# Patient Record
Sex: FEMALE | Race: WHITE | NOT HISPANIC OR LATINO
[De-identification: names, ages, dates, MRNs, and addresses within clinical notes are randomized per-mention and may not be internally consistent; named-entity substitution may affect disease eponyms.]

---

## 2019-04-04 PROBLEM — Z00.00 ENCOUNTER FOR PREVENTIVE HEALTH EXAMINATION: Status: ACTIVE | Noted: 2019-04-04

## 2019-04-29 ENCOUNTER — APPOINTMENT (OUTPATIENT)
Dept: CARDIOLOGY | Facility: CLINIC | Age: 69
End: 2019-04-29
Payer: MEDICARE

## 2019-04-29 ENCOUNTER — RECORD ABSTRACTING (OUTPATIENT)
Age: 69
End: 2019-04-29

## 2019-04-29 ENCOUNTER — NON-APPOINTMENT (OUTPATIENT)
Age: 69
End: 2019-04-29

## 2019-04-29 VITALS
OXYGEN SATURATION: 99 % | HEIGHT: 64 IN | SYSTOLIC BLOOD PRESSURE: 146 MMHG | WEIGHT: 174 LBS | BODY MASS INDEX: 29.71 KG/M2 | DIASTOLIC BLOOD PRESSURE: 78 MMHG | HEART RATE: 87 BPM

## 2019-04-29 DIAGNOSIS — Z87.891 PERSONAL HISTORY OF NICOTINE DEPENDENCE: ICD-10-CM

## 2019-04-29 DIAGNOSIS — Z17.0 ESTROGEN RECEPTOR POSITIVE STATUS [ER+]: ICD-10-CM

## 2019-04-29 DIAGNOSIS — Z86.59 PERSONAL HISTORY OF OTHER MENTAL AND BEHAVIORAL DISORDERS: ICD-10-CM

## 2019-04-29 DIAGNOSIS — Z86.39 PERSONAL HISTORY OF OTHER ENDOCRINE, NUTRITIONAL AND METABOLIC DISEASE: ICD-10-CM

## 2019-04-29 DIAGNOSIS — Z78.9 OTHER SPECIFIED HEALTH STATUS: ICD-10-CM

## 2019-04-29 PROCEDURE — 93000 ELECTROCARDIOGRAM COMPLETE: CPT

## 2019-04-29 PROCEDURE — 99204 OFFICE O/P NEW MOD 45 MIN: CPT

## 2019-04-29 RX ORDER — SENNOSIDES 8.6 MG
TABLET ORAL DAILY
Refills: 0 | Status: ACTIVE | COMMUNITY

## 2019-04-29 NOTE — HISTORY OF PRESENT ILLNESS
[FreeTextEntry1] : Her medical history includes\par #1 essential hypertension.\par #2 stage IA papillary thyroid carcinoma, complete thyroidectomy\par #3 history of lying was my name maureen to his\par #4 history of right breast cancer, status post right mastectomy, ER positive breast cancer stage IC in 1996.\par #5 severe gastroesophageal reflux disease.\par #6 osteoporosis.\par #7 history of relapsing mononucleosis

## 2019-04-29 NOTE — REVIEW OF SYSTEMS
[Feeling Fatigued] : feeling fatigued [see HPI] : see HPI [Joint Pain] : joint pain [Joint Stiffness] : joint stiffness [Negative] : Heme/Lymph [Fever] : no fever [Headache] : no headache [Recent Weight Gain (___ Lbs)] : no recent weight gain [Chills] : no chills [Recent Weight Loss (___ Lbs)] : no recent weight loss

## 2019-04-29 NOTE — DISCUSSION/SUMMARY
[FreeTextEntry1] : 69-year-old white female with history of thyroid and breast cancer, recently diagnosed to have elevated blood pressure, which is better controlled with antihypertensive therapy, atypical chest pain in presence of incomplete right bundle branch block on EKG, exertional dyspnea in presence of normal TSH, hemoglobin and renal function, intermittent palpitations without any significant high-grade symptoms with normal TSH and stable. Other labs.\par She has evidence of atherosclerotic vascular disease in the form of bilateral carotid bruits, right more than left, systolic, ejection murmur to suggest aortic valve sclerosis\par Recommendation at present for further evaluation includes\par Echocardiogram .\par Carotid Doppler study.\par 24-hour Holter monitor and if needed event recorder to evaluate her intermittent\par Stress myocardial perfusion scan on medication.\par Increase irbesartan to 300 mg at nighttime. Counseling done regarding blood pressure goal of less than 130/80\par CMP, lipid panel, BNP level.\par \par Low-salt, alcohol, and caffeine intake\par Increasing activity and exercise.\par Consider a sleep apnea evaluation in presence of generalized daytime fatigue and tiredness and according to her possible snoring at nighttime in presence of elevated blood pressure.\par \par Counseling regarding low saturated fat, salt and carbohydrate intake was reviewed. Active lifestyle and regular. Exercise along with weight management is advised.\par All the above were at length reviewed. Answered all the questions. Thank you very much for this kind referral. Please do not hesitate to give me a call for any question.\par Part of this transcription was done with voice recognition software and phonetically similar errors are common. I apologize for that. Please donot hesitate to call for any questions due to above.\par \par Followup after above tests

## 2019-04-29 NOTE — ASSESSMENT
[FreeTextEntry1] : Reviewed on April 29, 2019\par EKG normal sinus rhythm, incomplete right bundle branch block.\par Labs TSH 1.34, April 9, 2019\par Labs March 20, 2019 p.o. hemoglobin 13.2.\par Creatinine 1.0, BUN 18, potassium 4.2, sodium 142.

## 2019-04-29 NOTE — PHYSICAL EXAM
[General Appearance - Well Developed] : well developed [Normal Appearance] : normal appearance [Well Groomed] : well groomed [General Appearance - Well Nourished] : well nourished [No Deformities] : no deformities [General Appearance - In No Acute Distress] : no acute distress [Normal Conjunctiva] : the conjunctiva exhibited no abnormalities [Eyelids - No Xanthelasma] : the eyelids demonstrated no xanthelasmas [Normal Oral Mucosa] : normal oral mucosa [No Oral Pallor] : no oral pallor [No Oral Cyanosis] : no oral cyanosis [Normal Jugular Venous A Waves Present] : normal jugular venous A waves present [Normal Jugular Venous V Waves Present] : normal jugular venous V waves present [No Jugular Venous Gonzalez A Waves] : no jugular venous gonzalez A waves [Respiration, Rhythm And Depth] : normal respiratory rhythm and effort [Exaggerated Use Of Accessory Muscles For Inspiration] : no accessory muscle use [Auscultation Breath Sounds / Voice Sounds] : lungs were clear to auscultation bilaterally [Normal] : normal [No Precordial Heave] : no precordial heave was noted [Normal Rate] : normal [Normal S1] : normal S1 [Normal S2] : normal S2 [II] : a grade 2 [1+] : left 1+ [Right Carotid Bruit] : right carotid bruit heard [Left Carotid Bruit] : left carotid bruit heard [2+] : left 2+ [No Abnormalities] : the abdominal aorta was not enlarged and no bruit was heard [No Pitting Edema] : no pitting edema present [Abdomen Soft] : soft [Abdomen Tenderness] : non-tender [Abdomen Mass (___ Cm)] : no abdominal mass palpated [Abnormal Walk] : normal gait [Gait - Sufficient For Exercise Testing] : the gait was sufficient for exercise testing [Nail Clubbing] : no clubbing of the fingernails [Cyanosis, Localized] : no localized cyanosis [Petechial Hemorrhages (___cm)] : no petechial hemorrhages [Skin Color & Pigmentation] : normal skin color and pigmentation [] : no rash [No Venous Stasis] : no venous stasis [Skin Lesions] : no skin lesions [No Skin Ulcers] : no skin ulcer [No Xanthoma] : no  xanthoma was observed [Oriented To Time, Place, And Person] : oriented to person, place, and time [Affect] : the affect was normal [Mood] : the mood was normal [S3] : no S3 [S4] : no S4 [Right Femoral Bruit] : no bruit heard over the right femoral artery [Left Femoral Bruit] : no bruit heard over the left femoral artery [FreeTextEntry1] : anxiuos

## 2019-04-29 NOTE — REASON FOR VISIT
[Consultation] : a consultation regarding [Chest Pain] : chest pain [Dyspnea] : dyspnea [Hypertension] : hypertension [FreeTextEntry1] : 69-year-old white female is referred to me for consultation and presence of significantly increased. Blood pressure during iron infusion at hematologist office.\par She also has a right upper chest pain/pressure, like sensation. Nonexertional. No radiation. No associated arm pain jaw pain, diaphoresis, or palpitation. Usually lasted for minutes at a time. Resolves on its own.\par She also has exertional dyspnea when walking short distances or climbing stairs. This has not improved since iron infusions.\par Her symptoms have not gotten better by taking antihypertensive, which was started after her recent discussion.\par She also has intermittent palpitations. It does get worse with caffeinated drinks like he in the morning. It is not associated with dizziness and syncopal episode. There is no social or chest pain or shortness of breath.\par She has no prior history of diabetes mellitus, hyperlipidemia, peripheral arterial disease, rheumatic fever, ,, cerebrovascular accident.\par She does not sleep well. Her but does not have any diagnosis of sleep apnea.\par She has no PND, orthopnea, or pedal edema.\par She has no claudication pain.\par

## 2019-05-06 ENCOUNTER — APPOINTMENT (OUTPATIENT)
Dept: CARDIOLOGY | Facility: CLINIC | Age: 69
End: 2019-05-06
Payer: MEDICARE

## 2019-05-08 PROCEDURE — 93224 XTRNL ECG REC UP TO 48 HRS: CPT

## 2019-06-18 ENCOUNTER — APPOINTMENT (OUTPATIENT)
Dept: CARDIOLOGY | Facility: CLINIC | Age: 69
End: 2019-06-18
Payer: MEDICARE

## 2019-06-18 PROCEDURE — 93306 TTE W/DOPPLER COMPLETE: CPT

## 2019-06-18 PROCEDURE — 78451 HT MUSCLE IMAGE SPECT SING: CPT

## 2019-06-18 PROCEDURE — 93015 CV STRESS TEST SUPVJ I&R: CPT

## 2019-06-18 PROCEDURE — 93880 EXTRACRANIAL BILAT STUDY: CPT

## 2019-07-26 ENCOUNTER — APPOINTMENT (OUTPATIENT)
Dept: CARDIOLOGY | Facility: CLINIC | Age: 69
End: 2019-07-26
Payer: MEDICARE

## 2019-07-26 VITALS
OXYGEN SATURATION: 95 % | HEART RATE: 80 BPM | SYSTOLIC BLOOD PRESSURE: 146 MMHG | WEIGHT: 170 LBS | DIASTOLIC BLOOD PRESSURE: 62 MMHG | BODY MASS INDEX: 29.18 KG/M2

## 2019-07-26 DIAGNOSIS — R94.39 ABNORMAL RESULT OF OTHER CARDIOVASCULAR FUNCTION STUDY: ICD-10-CM

## 2019-07-26 PROCEDURE — 99215 OFFICE O/P EST HI 40 MIN: CPT

## 2019-07-26 NOTE — REASON FOR VISIT
[Follow-Up - Clinic] : a clinic follow-up of [Chest Pain] : chest pain [Dyspnea] : dyspnea [FreeTextEntry1] : 69-year-old white female comes in for followup consultation today. Review her multiple cardiovascular test and labs.\par She has continued significant exertional dyspnea since last seen.\par As you know she was seen last on April 29, 2019 with complaints as noted below\par She also has a right upper chest pain/pressure, like sensation. Nonexertional. No radiation. No associated arm pain jaw pain, diaphoresis, or palpitation. Usually lasted for minutes at a time. Resolves on its own.\par She also has exertional dyspnea when walking short distances or climbing stairs. This has not improved since iron infusions.\par Her symptoms have not gotten better by taking antihypertensive, which was started after her recent discussion.\par She also has intermittent palpitations. It does get worse with caffeinated drinks like he in the morning. It is not associated with dizziness and syncopal episode. There is no social or chest pain or shortness of breath.\par She has no prior history of diabetes mellitus, hyperlipidemia, peripheral arterial disease, rheumatic fever, ,, cerebrovascular accident.\par She does not sleep well. Her but does not have any diagnosis of sleep apnea.\par She has no PND, orthopnea, or pedal edema.\par She has no claudication pain.\par  [Hypertension] : hypertension

## 2019-07-26 NOTE — HISTORY OF PRESENT ILLNESS
[FreeTextEntry1] : Her medical history includes\par #1 essential hypertension.\par #2 stage IA papillary thyroid carcinoma, complete thyroidectomy\par #3 ?\par #4 history of right breast cancer, status post right mastectomy, ER positive breast cancer stage IC in 1996.\par #5 severe gastroesophageal reflux disease.\par #6 osteoporosis.\par #7 history of relapsing mononucleosis

## 2019-07-26 NOTE — REVIEW OF SYSTEMS
[Fever] : no fever [Headache] : no headache [Recent Weight Gain (___ Lbs)] : no recent weight gain [Chills] : no chills [Recent Weight Loss (___ Lbs)] : no recent weight loss [Feeling Fatigued] : feeling fatigued [see HPI] : see HPI [Joint Stiffness] : joint stiffness [Joint Pain] : joint pain [Negative] : Heme/Lymph

## 2019-07-26 NOTE — DISCUSSION/SUMMARY
[FreeTextEntry1] : 69-year-old white female with history of thyroid and breast cancer, recently diagnosed to have elevated blood pressure, which is better controlled with antihypertensive therapy, atypical chest pain in presence of incomplete right bundle branch block on EKG, exertional dyspnea in presence of normal TSH, hemoglobin and renal function, intermittent palpitations without any significant high-grade symptoms with normal TSH and stable. Other labs.\par Certified evaluations shows preserved ejection fraction with mild mitral regurgitation, mild carotid atherosclerosis, elevated LDL cholesterol, but excellent HDL cholesterol, normal BMP with stable CMP, low level of exercise with significant shortness of breath limiting functional status and suboptimal test to rule out coronary artery disease and technical limitation and difficulty in performing nuclear marker perfusion scan.\par In presence of continued exertional dyspnea at low level of exercise with coronary artery disease factors. We had discussed options of\par Coronary angiography/right heart catheterization.\par Vs CTA of coronary disease, with calcification score.\par At present she has opted for a CTA of coronaries. Understands risk of contrast and radiation.\par She had normal renal function and no allergies to contrast.\par If about test does not show any significant abnormality, consider pulmonary evaluation.\par If there is evidence of coronary artery disease. Consider statin therapy to the present regimen.\par \par Continued antihypertensive therapy and adjust further if blood pressure remains elevated, and greater than 130/80 after her CTA.\par \par Counseling regarding low saturated fat, salt and carbohydrate intake was reviewed. Active lifestyle and regular. Exercise along with weight management is advised.\par All the above were at length reviewed. Answered all the questions. Thank you very much for this kind referral. Please do not hesitate to give me a call for any question.\par Part of this transcription was done with voice recognition software and phonetically similar errors are common. I apologize for that. Please do not hesitate to call for any questions due to above.\par \par \par

## 2019-07-26 NOTE — PHYSICAL EXAM
[General Appearance - Well Developed] : well developed [Normal Appearance] : normal appearance [Well Groomed] : well groomed [General Appearance - Well Nourished] : well nourished [No Deformities] : no deformities [General Appearance - In No Acute Distress] : no acute distress [Normal Conjunctiva] : the conjunctiva exhibited no abnormalities [Eyelids - No Xanthelasma] : the eyelids demonstrated no xanthelasmas [Normal Oral Mucosa] : normal oral mucosa [No Oral Pallor] : no oral pallor [No Oral Cyanosis] : no oral cyanosis [Normal Jugular Venous V Waves Present] : normal jugular venous V waves present [Respiration, Rhythm And Depth] : normal respiratory rhythm and effort [Exaggerated Use Of Accessory Muscles For Inspiration] : no accessory muscle use [Auscultation Breath Sounds / Voice Sounds] : lungs were clear to auscultation bilaterally [Abnormal Walk] : normal gait [Gait - Sufficient For Exercise Testing] : the gait was sufficient for exercise testing [Nail Clubbing] : no clubbing of the fingernails [Cyanosis, Localized] : no localized cyanosis [Skin Color & Pigmentation] : normal skin color and pigmentation [] : no rash [Oriented To Time, Place, And Person] : oriented to person, place, and time [Affect] : the affect was normal [FreeTextEntry1] : anxiuos [Mood] : the mood was normal [Normal Rate] : normal [No Precordial Heave] : no precordial heave was noted [Normal] : normal [Normal S2] : normal S2 [Normal S1] : normal S1 [S4] : no S4 [S3] : no S3 [II] : a grade 2 [1+] : Carotid: right 1+ [Right Carotid Bruit] : right carotid bruit heard [Left Carotid Bruit] : left carotid bruit heard [Left Femoral Bruit] : no bruit heard over the left femoral artery [Right Femoral Bruit] : no bruit heard over the right femoral artery [2+] : left 2+ [No Abnormalities] : the abdominal aorta was not enlarged and no bruit was heard [No Pitting Edema] : no pitting edema present

## 2019-07-26 NOTE — ASSESSMENT
[FreeTextEntry1] : Reviewed on April 29, 2019\par EKG normal sinus rhythm, incomplete right bundle branch block.\par Labs TSH 1.34, April 9, 2019\par Labs March 20, 2019 p.o. hemoglobin 13.2.\par Creatinine 1.0, BUN 18, potassium 4.2, sodium 142.\par \par Reviewed on July 26, 2019\par Labs from May 30, 2019 had shown normal. BNP, HDL 80 .\par Echocardiogram June 18, 2019 and ejection fraction 60% mild mitral regurgitation\par Carotid Doppler study June 18, 2019 mild carotid atherosclerosis nonobstructive.\par Exercise treadmill stress test low level of exercise. Suboptimal.\par Nuclear marker perfusion scan could not be completed because of technical difficulty\par

## 2019-07-30 ENCOUNTER — RX RENEWAL (OUTPATIENT)
Age: 69
End: 2019-07-30

## 2019-08-23 ENCOUNTER — APPOINTMENT (OUTPATIENT)
Dept: CARDIOLOGY | Facility: CLINIC | Age: 69
End: 2019-08-23
Payer: MEDICARE

## 2019-08-23 VITALS
BODY MASS INDEX: 28.68 KG/M2 | HEIGHT: 64 IN | WEIGHT: 168 LBS | DIASTOLIC BLOOD PRESSURE: 62 MMHG | OXYGEN SATURATION: 98 % | HEART RATE: 71 BPM | SYSTOLIC BLOOD PRESSURE: 118 MMHG

## 2019-08-23 DIAGNOSIS — A69.20 LYME DISEASE, UNSPECIFIED: ICD-10-CM

## 2019-08-23 PROCEDURE — 99214 OFFICE O/P EST MOD 30 MIN: CPT

## 2019-08-23 NOTE — REVIEW OF SYSTEMS
[Fever] : no fever [Headache] : no headache [Recent Weight Gain (___ Lbs)] : no recent weight gain [Chills] : no chills [Feeling Fatigued] : feeling fatigued [see HPI] : see HPI [Recent Weight Loss (___ Lbs)] : no recent weight loss [Joint Stiffness] : joint stiffness [Joint Pain] : joint pain [Negative] : Heme/Lymph

## 2019-08-23 NOTE — DISCUSSION/SUMMARY
[FreeTextEntry1] : 69-year-old white female with history of thyroid and breast cancer, recently diagnosed to have elevated blood pressure, which is better controlled with antihypertensive therapy, atypical chest pain in presence of incomplete right bundle branch block on EKG, exertional dyspnea in presence of normal TSH, hemoglobin and renal function, intermittent palpitations without any significant high-grade symptoms with normal TSH and stable. Other labs.\par cardiac evaluations shows preserved ejection fraction with mild mitral regurgitation, mild carotid atherosclerosis, elevated LDL cholesterol, but excellent HDL cholesterol, normal BMP with stable CMP, low level of exercise with significant shortness of breath limiting functional status and suboptimal test to rule out coronary artery disease and technical limitation and difficulty in performing nuclear myocardial perfusion scan.Now coronary CTA shows nonobstructive calcified/noncalcified coronary atherosclerotic vascular disease.\par \par I have recommended to her.\par Pulmonary evaluation.\par Aspirin 81 mg once daily.\par Atorvastatin 40 mg once daily. Risks, benefits, and side effects discussed. If tolerated well. She will have a CMP, and lipid panel checked in about 6-8 weeks.\par If continued symptoms and no significant pulmonary disease. She would benefit from invasive coronary angiography/right heart catheterization.\par Continue hypertension, management. goal <130/80\par \par Counseling regarding low saturated fat, salt and carbohydrate intake was reviewed. Active lifestyle and regular. Exercise along with weight management is advised.\par All the above were at length reviewed. Answered all the questions. Thank you very much for this kind referral. Please do not hesitate to give me a call for any question.\par Part of this transcription was done with voice recognition software and phonetically similar errors are common. I apologize for that. Please do not hesitate to call for any questions due to above.\par \par \par

## 2019-08-23 NOTE — PHYSICAL EXAM
[General Appearance - Well Developed] : well developed [Normal Appearance] : normal appearance [Well Groomed] : well groomed [General Appearance - Well Nourished] : well nourished [No Deformities] : no deformities [General Appearance - In No Acute Distress] : no acute distress [Normal Conjunctiva] : the conjunctiva exhibited no abnormalities [Normal Oral Mucosa] : normal oral mucosa [Eyelids - No Xanthelasma] : the eyelids demonstrated no xanthelasmas [No Oral Pallor] : no oral pallor [No Oral Cyanosis] : no oral cyanosis [Normal Jugular Venous V Waves Present] : normal jugular venous V waves present [] : no respiratory distress [Respiration, Rhythm And Depth] : normal respiratory rhythm and effort [Exaggerated Use Of Accessory Muscles For Inspiration] : no accessory muscle use [Auscultation Breath Sounds / Voice Sounds] : lungs were clear to auscultation bilaterally [Heart Rate And Rhythm] : heart rate and rhythm were normal [Heart Sounds] : normal S1 and S2 [Arterial Pulses Normal] : the arterial pulses were normal [Edema] : no peripheral edema present [Abnormal Walk] : normal gait [Gait - Sufficient For Exercise Testing] : the gait was sufficient for exercise testing [Nail Clubbing] : no clubbing of the fingernails [Cyanosis, Localized] : no localized cyanosis [Skin Color & Pigmentation] : normal skin color and pigmentation [Oriented To Time, Place, And Person] : oriented to person, place, and time [Affect] : the affect was normal [Mood] : the mood was normal [FreeTextEntry1] : anxiuos

## 2019-08-23 NOTE — REASON FOR VISIT
[Follow-Up - Clinic] : a clinic follow-up of [Chest Pain] : chest pain [Dyspnea] : dyspnea [Hypertension] : hypertension [FreeTextEntry1] : 69-year-old white female comes in for followup consultation today To review coronary CTA.\par She has continued significant exertional dyspnea since last seen.\par As you know she was seen in the past with complaints as noted below\par She also has a right upper chest pain/pressure, like sensation. Nonexertional. No radiation. No associated arm pain jaw pain, diaphoresis, or palpitation. Usually lasted for minutes at a time. Resolves on its own.\par She also has exertional dyspnea when walking short distances or climbing stairs. This has not improved since iron infusions.\par Her symptoms have not gotten better by taking antihypertensive, which was started after her recent discussion.\par She also has intermittent palpitations. It does get worse with caffeinated drinks like he in the morning. It is not associated with dizziness and syncopal episode. There is no social or chest pain or shortness of breath.\par She has no prior history of diabetes mellitus, hyperlipidemia, peripheral arterial disease, rheumatic fever, ,, cerebrovascular accident.\par She does not sleep well. Her but does not have any diagnosis of sleep apnea.\par She has no PND, orthopnea, or pedal edema.\par She has no claudication pain.\par

## 2019-08-23 NOTE — ASSESSMENT
[FreeTextEntry1] : Reviewed on April 29, 2019\par EKG normal sinus rhythm, incomplete right bundle branch block.\par Labs TSH 1.34, April 9, 2019\par Labs March 20, 2019 p.o. hemoglobin 13.2.\par Creatinine 1.0, BUN 18, potassium 4.2, sodium 142.\par \par Reviewed on July 26, 2019\par Labs from May 30, 2019 had shown normal. BNP, HDL 80 .\par Echocardiogram June 18, 2019 and ejection fraction 60% mild mitral regurgitation\par Carotid Doppler study June 18, 2019 mild carotid atherosclerosis nonobstructive.\par Exercise treadmill stress test low level of exercise. Suboptimal.\par Nuclear marker perfusion scan could not be completed because of technical difficulty\par \par Reviewed on August 23, 2019\par Coronary CTA, August 15, 2019. No active pulmonary disease.\par Coronary calcium score 106.\par Atherosclerosis changes with no hemodynamically significant stenosis. Mixture of calcified and noncalcified plaque in proximal and midportion of the LAD, left circumflex/OM one, right coronary artery.

## 2019-10-29 ENCOUNTER — APPOINTMENT (OUTPATIENT)
Dept: MAMMOGRAPHY | Facility: CLINIC | Age: 69
End: 2019-10-29
Payer: MEDICARE

## 2019-10-29 ENCOUNTER — APPOINTMENT (OUTPATIENT)
Dept: ULTRASOUND IMAGING | Facility: CLINIC | Age: 69
End: 2019-10-29

## 2019-10-29 PROCEDURE — 77067 SCR MAMMO BI INCL CAD: CPT | Mod: 52,LT

## 2019-10-29 PROCEDURE — 77063 BREAST TOMOSYNTHESIS BI: CPT | Mod: 52,LT

## 2019-10-29 PROCEDURE — 76641 ULTRASOUND BREAST COMPLETE: CPT | Mod: LT

## 2020-02-20 ENCOUNTER — APPOINTMENT (OUTPATIENT)
Dept: CARDIOLOGY | Facility: CLINIC | Age: 70
End: 2020-02-20

## 2020-02-27 ENCOUNTER — APPOINTMENT (OUTPATIENT)
Dept: MRI IMAGING | Facility: CLINIC | Age: 70
End: 2020-02-27
Payer: MEDICARE

## 2020-02-27 PROCEDURE — A9585A: CUSTOM

## 2020-02-27 PROCEDURE — A9585: CPT | Mod: JW

## 2020-02-27 PROCEDURE — 74183 MRI ABD W/O CNTR FLWD CNTR: CPT

## 2020-05-01 ENCOUNTER — APPOINTMENT (OUTPATIENT)
Dept: CARDIOLOGY | Facility: CLINIC | Age: 70
End: 2020-05-01

## 2020-07-31 ENCOUNTER — APPOINTMENT (OUTPATIENT)
Dept: CARDIOLOGY | Facility: CLINIC | Age: 70
End: 2020-07-31
Payer: MEDICARE

## 2020-07-31 ENCOUNTER — NON-APPOINTMENT (OUTPATIENT)
Age: 70
End: 2020-07-31

## 2020-07-31 VITALS
DIASTOLIC BLOOD PRESSURE: 62 MMHG | HEART RATE: 67 BPM | BODY MASS INDEX: 28.17 KG/M2 | TEMPERATURE: 98.4 F | HEIGHT: 64 IN | SYSTOLIC BLOOD PRESSURE: 142 MMHG | WEIGHT: 165 LBS | OXYGEN SATURATION: 99 %

## 2020-07-31 PROCEDURE — 93000 ELECTROCARDIOGRAM COMPLETE: CPT

## 2020-07-31 PROCEDURE — 99214 OFFICE O/P EST MOD 30 MIN: CPT

## 2020-07-31 RX ORDER — DOXYCYCLINE HYCLATE 50 MG/1
CAPSULE ORAL
Refills: 0 | Status: DISCONTINUED | COMMUNITY
End: 2020-07-31

## 2020-07-31 RX ORDER — ASPIRIN ENTERIC COATED TABLETS 81 MG 81 MG/1
81 TABLET, DELAYED RELEASE ORAL DAILY
Qty: 90 | Refills: 3 | Status: DISCONTINUED | COMMUNITY
Start: 2019-08-23 | End: 2020-07-31

## 2020-07-31 NOTE — PHYSICAL EXAM
[Well Groomed] : well groomed [Normal Appearance] : normal appearance [General Appearance - Well Developed] : well developed [No Deformities] : no deformities [General Appearance - In No Acute Distress] : no acute distress [General Appearance - Well Nourished] : well nourished [Normal Oral Mucosa] : normal oral mucosa [Normal Conjunctiva] : the conjunctiva exhibited no abnormalities [Eyelids - No Xanthelasma] : the eyelids demonstrated no xanthelasmas [No Oral Pallor] : no oral pallor [No Oral Cyanosis] : no oral cyanosis [Normal Jugular Venous V Waves Present] : normal jugular venous V waves present [] : no respiratory distress [Exaggerated Use Of Accessory Muscles For Inspiration] : no accessory muscle use [Respiration, Rhythm And Depth] : normal respiratory rhythm and effort [Heart Rate And Rhythm] : heart rate and rhythm were normal [Auscultation Breath Sounds / Voice Sounds] : lungs were clear to auscultation bilaterally [Heart Sounds] : normal S1 and S2 [Edema] : no peripheral edema present [Gait - Sufficient For Exercise Testing] : the gait was sufficient for exercise testing [Abnormal Walk] : normal gait [Cyanosis, Localized] : no localized cyanosis [Nail Clubbing] : no clubbing of the fingernails [Affect] : the affect was normal [Skin Color & Pigmentation] : normal skin color and pigmentation [Oriented To Time, Place, And Person] : oriented to person, place, and time [Mood] : the mood was normal [Abdomen Soft] : soft [FreeTextEntry1] : No bruit

## 2020-07-31 NOTE — REVIEW OF SYSTEMS
[Feeling Fatigued] : feeling fatigued [Joint Pain] : joint pain [Joint Stiffness] : joint stiffness [see HPI] : see HPI [Negative] : Heme/Lymph [Fever] : no fever [Headache] : no headache [Chills] : no chills [Recent Weight Gain (___ Lbs)] : no recent weight gain [Recent Weight Loss (___ Lbs)] : no recent weight loss [Shortness Of Breath] : shortness of breath [Dyspnea on exertion] : dyspnea during exertion [Chest  Pressure] : no chest pressure [Chest Pain] : no chest pain [Lower Ext Edema] : no extremity edema [Leg Claudication] : intermittent leg claudication [Palpitations] : palpitations

## 2020-07-31 NOTE — REASON FOR VISIT
[Follow-Up - Clinic] : a clinic follow-up of [Chest Pain] : chest pain [Dyspnea] : dyspnea [Hypertension] : hypertension [FreeTextEntry1] : 70-year-old white female comes in for followup consultation today with complaint of right calf tightness every time she walks quickly for 100 to 200 yards.  Once she rest symptoms improves.  None at rest.  No associated swelling or change in the color.  She has noticed it over the last few weeks.  Has not gotten worse.  No recent trauma.  No back pain.\par She has continued significant exertional dyspnea since last seen.  She has seen pulmonologist.  Evaluation was not completed because of COVID-19.  Now planned to have further evaluation as scheduled.\par Her symptoms have not gotten better by taking antihypertensive, which was started after her recent discussion.\par She also has intermittent palpitations. It does get worse with caffeinated drinks like he in the morning. It is not associated with dizziness and syncopal episode. There is no social or chest pain or shortness of breath.  Overall stable.\par She has no prior history of diabetes mellitus, hyperlipidemia, peripheral arterial disease, rheumatic fever, ,, cerebrovascular accident.\par She does not sleep well. Her but does not have any diagnosis of sleep apnea.\par She has no PND, orthopnea, or pedal edema.\par Her statin was decreased to every other day because of elevated alkaline phosphatase.\par No recent hospital admission\par

## 2020-07-31 NOTE — ASSESSMENT
[FreeTextEntry1] : Reviewed on April 29, 2019\par EKG normal sinus rhythm, incomplete right bundle branch block.\par Labs TSH 1.34, April 9, 2019\par Labs March 20, 2019 p.o. hemoglobin 13.2.\par Creatinine 1.0, BUN 18, potassium 4.2, sodium 142.\par \par Reviewed on July 26, 2019\par Labs from May 30, 2019 had shown normal. BNP, HDL 80 .\par Echocardiogram June 18, 2019 and ejection fraction 60% mild mitral regurgitation\par Carotid Doppler study June 18, 2019 mild carotid atherosclerosis nonobstructive.\par Exercise treadmill stress test low level of exercise. Suboptimal.\par Nuclear marker perfusion scan could not be completed because of technical difficulty\par \par Reviewed on August 23, 2019\par Coronary CTA, August 15, 2019. No active pulmonary disease.\par Coronary calcium score 106.\par Atherosclerosis changes with no hemodynamically significant stenosis. Mixture of calcified and noncalcified plaque in proximal and midportion of the LAD, left circumflex/OM one, right coronary artery.\par \par Reviewed on July 31, 2020.\par EKG July 31, 2020 reviewed.\par Labs from February 27, 2020 stable CBC.  Triglycerides 71 HDL 87 LDL 93 total cholesterol 194

## 2020-07-31 NOTE — DISCUSSION/SUMMARY
[FreeTextEntry1] : 70-year-old white female with history of thyroid and breast cancer, recently diagnosed to have elevated blood pressure, which is better controlled with antihypertensive therapy, atypical chest pain in presence of incomplete right bundle branch block on EKG, exertional dyspnea in presence of normal TSH, hemoglobin and renal function, intermittent palpitations without any significant high-grade symptoms with normal TSH and stable. Other labs.\par cardiac evaluations shows preserved ejection fraction with mild mitral regurgitation, mild carotid atherosclerosis, elevated LDL cholesterol, but excellent HDL cholesterol, normal BMP with stable CMP, low level of exercise with significant shortness of breath limiting functional status and suboptimal test to rule out coronary artery disease and technical limitation and difficulty in performing nuclear myocardial perfusion scan.Now coronary CTA shows nonobstructive calcified/noncalcified coronary atherosclerotic vascular disease.\par Now comes in with right lower extremity claudication pain.  Without resting signs of ischemia.\par LDL cholesterol improved with lower dose of statin and presence of abnormal liver function tests at a higher dose of statins.\par Pulmonary evaluation is not completed for her dyspnea on exertion.\par \par I have recommended to her.\par GORDO/PVR study.  Consider vascular evaluation if abnormal.\par Aspirin 81 mg once daily.  It did give her hemorrhoidal bleeding.  Recommended to try to take it every other day.\par Atorvastatin to be continued every other day at 40 mg.  More aggressive lifestyle and risk factor modifications.\par If continued symptoms and no significant pulmonary disease. She would benefit from invasive coronary angiography/right heart catheterization.\par Continue hypertension, management. goal <130/80, continue irbesartan 300 mg.\par Regular increasing walking is recommended.\par \par Counseling regarding low saturated fat, salt and carbohydrate intake was reviewed. Active lifestyle and regular. Exercise along with weight management is advised.\par All the above were at length reviewed. Answered all the questions. Thank you very much for this kind referral. Please do not hesitate to give me a call for any question.\par Part of this transcription was done with voice recognition software and phonetically similar errors are common. I apologize for that. Please do not hesitate to call for any questions due to above.\par \par \par

## 2020-09-16 ENCOUNTER — APPOINTMENT (OUTPATIENT)
Dept: CARDIOLOGY | Facility: CLINIC | Age: 70
End: 2020-09-16
Payer: MEDICARE

## 2020-09-16 PROCEDURE — 93923 UPR/LXTR ART STDY 3+ LVLS: CPT

## 2020-10-30 ENCOUNTER — APPOINTMENT (OUTPATIENT)
Dept: CARDIOLOGY | Facility: CLINIC | Age: 70
End: 2020-10-30
Payer: MEDICARE

## 2020-10-30 VITALS
SYSTOLIC BLOOD PRESSURE: 148 MMHG | HEIGHT: 66 IN | WEIGHT: 168 LBS | BODY MASS INDEX: 27 KG/M2 | OXYGEN SATURATION: 100 % | HEART RATE: 79 BPM | DIASTOLIC BLOOD PRESSURE: 62 MMHG

## 2020-10-30 DIAGNOSIS — J43.9 EMPHYSEMA, UNSPECIFIED: ICD-10-CM

## 2020-10-30 PROCEDURE — 99214 OFFICE O/P EST MOD 30 MIN: CPT

## 2020-10-30 NOTE — REASON FOR VISIT
[Follow-Up - Clinic] : a clinic follow-up of [Chest Pain] : chest pain [Dyspnea] : dyspnea [Hypertension] : hypertension [FreeTextEntry1] : 70-year-old white female comes in for follow-up consultation to review her GORDO/PVR study, recent pulmonary evaluation, review her blood pressure control which has been significantly elevated during her iron infusions.\par She could not tolerate bronchodilators which were given for mild emphysema by pulmonologist after pulmonary function test.\par She has been noted to have significantly elevated blood pressure during the Venofer injections.  As high as 182/78.\par She has stable exertional dyspnea.\par She also has intermittent palpitations. It does get worse with caffeinated drinks like he in the morning. It is not associated with dizziness and syncopal episode. There is no social or chest pain or shortness of breath.\par She has no prior history of diabetes mellitus, hyperlipidemia, peripheral arterial disease, rheumatic fever, ,, cerebrovascular accident.\par She does not sleep well. Her but does not have any diagnosis of sleep apnea.\par She has no PND, orthopnea, or pedal edema.\par She has no claudication pain.\par

## 2020-10-30 NOTE — ASSESSMENT
[FreeTextEntry1] : Reviewed on April 29, 2019\par EKG normal sinus rhythm, incomplete right bundle branch block.\par Labs TSH 1.34, April 9, 2019\par Labs March 20, 2019 p.o. hemoglobin 13.2.\par Creatinine 1.0, BUN 18, potassium 4.2, sodium 142.\par \par Reviewed on July 26, 2019\par Labs from May 30, 2019 had shown normal. BNP, HDL 80 .\par Echocardiogram June 18, 2019 and ejection fraction 60% mild mitral regurgitation\par Carotid Doppler study June 18, 2019 mild carotid atherosclerosis nonobstructive.\par Exercise treadmill stress test low level of exercise. Suboptimal.\par Nuclear marker perfusion scan could not be completed because of technical difficulty\par \par Reviewed on August 23, 2019\par Coronary CTA, August 15, 2019. No active pulmonary disease.\par Coronary calcium score 106.\par Atherosclerosis changes with no hemodynamically significant stenosis. Mixture of calcified and noncalcified plaque in proximal and midportion of the LAD, left circumflex/OM one, right coronary artery.\par \par Reviewed October 30, 2020.\par GORDO/PVR study.  Left lower extremity noncompressible.  Right could not be evaluated.\par Labs which were done recently showed stable  CMP and lipid panel.

## 2020-10-30 NOTE — DISCUSSION/SUMMARY
[FreeTextEntry1] : 71-year-old female with above medical history active medical problems as noted below\par 1.  Thyroid breast cancer, iron deficiency anemia follow-up with oncology\par 2.  Uncontrolled hypertension.  Compliance with salt alcohol medication has been noted.  Will add chlorthalidone 25 mg to the present regimen.  If tolerated and improved blood pressure control we can combine it with her irbesartan.  If remains uncontrolled combination of ARB, calcium channel blocker like amlodipine and thiazide diuretics would be wise.\par Continue low-salt diet increasing exercise and lower alcohol intake recommended\par 3.  Claudication pain.  Right lower extremity arterial duplex study is unable to perform GORDO has been recommended.\par 4.  Dyslipidemia.  Very well controlled continue with atorvastatin\par 5.  Hypothyroidism being followed with primary care physician\par 6.  Dyspnea.  Mild emphysema.  Unable to tolerate inhalers.\par 7.  Nonobstructive coronary artery disease.  Continue lifestyle and risk factor modifications.\par \par Counseling regarding low saturated fat, salt and carbohydrate intake was reviewed. Active lifestyle and regular. Exercise along with weight management is advised.\par All the above were at length reviewed. Answered all the questions. Thank you very much for this kind referral. Please do not hesitate to give me a call for any question.\par Part of this transcription was done with voice recognition software and phonetically similar errors are common. I apologize for that. Please do not hesitate to call for any questions due to above.\par \par \par

## 2020-10-30 NOTE — REVIEW OF SYSTEMS
[Feeling Fatigued] : feeling fatigued [see HPI] : see HPI [Joint Pain] : joint pain [Joint Stiffness] : joint stiffness [Negative] : Heme/Lymph [Shortness Of Breath] : shortness of breath [Dyspnea on exertion] : dyspnea during exertion [Leg Claudication] : intermittent leg claudication [Fever] : no fever [Headache] : no headache [Recent Weight Gain (___ Lbs)] : no recent weight gain [Chills] : no chills [Recent Weight Loss (___ Lbs)] : no recent weight loss [Chest  Pressure] : no chest pressure [Chest Pain] : no chest pain [Lower Ext Edema] : no extremity edema [Palpitations] : no palpitations

## 2020-10-30 NOTE — PHYSICAL EXAM
[General Appearance - Well Developed] : well developed [Normal Appearance] : normal appearance [Well Groomed] : well groomed [General Appearance - Well Nourished] : well nourished [No Deformities] : no deformities [General Appearance - In No Acute Distress] : no acute distress [Normal Conjunctiva] : the conjunctiva exhibited no abnormalities [Eyelids - No Xanthelasma] : the eyelids demonstrated no xanthelasmas [] : no respiratory distress [Respiration, Rhythm And Depth] : normal respiratory rhythm and effort [Exaggerated Use Of Accessory Muscles For Inspiration] : no accessory muscle use [Auscultation Breath Sounds / Voice Sounds] : lungs were clear to auscultation bilaterally [Heart Rate And Rhythm] : heart rate and rhythm were normal [Heart Sounds] : normal S1 and S2 [Arterial Pulses Normal] : the arterial pulses were normal [Edema] : no peripheral edema present [Abnormal Walk] : normal gait [Gait - Sufficient For Exercise Testing] : the gait was sufficient for exercise testing [Nail Clubbing] : no clubbing of the fingernails [Cyanosis, Localized] : no localized cyanosis [Skin Color & Pigmentation] : normal skin color and pigmentation [Oriented To Time, Place, And Person] : oriented to person, place, and time [Affect] : the affect was normal [Mood] : the mood was normal [FreeTextEntry1] : anxiuos

## 2020-11-10 ENCOUNTER — APPOINTMENT (OUTPATIENT)
Dept: MRI IMAGING | Facility: CLINIC | Age: 70
End: 2020-11-10
Payer: MEDICARE

## 2020-11-10 ENCOUNTER — APPOINTMENT (OUTPATIENT)
Dept: ULTRASOUND IMAGING | Facility: CLINIC | Age: 70
End: 2020-11-10

## 2020-11-10 PROCEDURE — 73221 MRI JOINT UPR EXTREM W/O DYE: CPT | Mod: RT,MH

## 2020-11-10 PROCEDURE — 93926 LOWER EXTREMITY STUDY: CPT | Mod: RT

## 2020-11-30 ENCOUNTER — APPOINTMENT (OUTPATIENT)
Dept: CARDIOLOGY | Facility: CLINIC | Age: 70
End: 2020-11-30
Payer: MEDICARE

## 2020-11-30 VITALS — WEIGHT: 165 LBS | BODY MASS INDEX: 26.52 KG/M2 | HEIGHT: 66 IN

## 2020-11-30 PROCEDURE — 99443: CPT | Mod: 95

## 2020-11-30 RX ORDER — LEVOTHYROXINE SODIUM 100 UG/1
100 TABLET ORAL
Refills: 0 | Status: ACTIVE | COMMUNITY

## 2020-11-30 RX ORDER — ERGOCALCIFEROL 1.25 MG/1
1.25 MG CAPSULE, LIQUID FILLED ORAL
Qty: 12 | Refills: 0 | Status: ACTIVE | COMMUNITY
Start: 2020-09-14

## 2020-11-30 RX ORDER — CHOLECALCIFEROL (VITAMIN D3) 1250 MCG
1.25 MG CAPSULE ORAL WEEKLY
Refills: 0 | Status: DISCONTINUED | COMMUNITY
End: 2020-11-30

## 2020-11-30 NOTE — REVIEW OF SYSTEMS
[Feeling Fatigued] : feeling fatigued [see HPI] : see HPI [Shortness Of Breath] : shortness of breath [Dyspnea on exertion] : dyspnea during exertion [Leg Claudication] : intermittent leg claudication [Joint Pain] : joint pain [Joint Stiffness] : joint stiffness [Negative] : Heme/Lymph [Fever] : no fever [Headache] : no headache [Recent Weight Gain (___ Lbs)] : no recent weight gain [Chills] : no chills [Recent Weight Loss (___ Lbs)] : no recent weight loss [Chest  Pressure] : no chest pressure [Chest Pain] : no chest pain [Lower Ext Edema] : no extremity edema [Palpitations] : no palpitations

## 2020-11-30 NOTE — DISCUSSION/SUMMARY
[FreeTextEntry1] : 70-year-old female with above medical history active medical problems as noted below\par 1.  Labs reviewed.  Mild hyponatremia elevated BUN.  Increase fluid intake.  Decrease chlorthalidone to 12.5 mg.\par 2.  Uncontrolled hypertension.  Compliance with salt alcohol medication has been noted.  Follow-up blood pressure in the office.  Decrease chlorthalidone to 12.5 mg considering lower sodium level..  If tolerated and improved blood pressure control we can combine it with her irbesartan.  If remains uncontrolled combination of ARB, calcium channel blocker like amlodipine and thiazide diuretics would be wise.\par Continue low-salt diet increasing exercise and lower alcohol intake recommended\par 3.  Claudication pain.  Significant stenosis on Doppler study involving distal right common femoral and superficial femoral artery.  Follow-up with vascular medicine physician.  Continue aspirin and statin.  Walking.  Hypertension management.  Possible further evaluation may include CT angiography with runoff and/or conventional peripheral angiography.\par 4.  Dyslipidemia.  Very well controlled continue with atorvastatin\par 5.  Hypothyroidism being followed with primary care physician\par 6.  Dyspnea.  Mild emphysema.  Unable to tolerate inhalers.\par 7.  Nonobstructive coronary artery disease.  Continue lifestyle and risk factor modifications.  Continue statin aspirin and risk factor modification\par \par Counseling regarding low saturated fat, salt and carbohydrate intake was reviewed. Active lifestyle and regular. Exercise along with weight management is advised.\par All the above were at length reviewed. Answered all the questions. Thank you very much for this kind referral. Please do not hesitate to give me a call for any question.\par Part of this transcription was done with voice recognition software and phonetically similar errors are common. I apologize for that. Please do not hesitate to call for any questions due to above.\par \par \par

## 2020-11-30 NOTE — REASON FOR VISIT
[Hyperlipidemia] : hyperlipidemia [Hypertension] : hypertension [Peripheral Vascular Disease] : peripheral vascular disease [FreeTextEntry2] : 2 way audio visit to review labs, hypertwnsion, PAD [FreeTextEntry1] : Consent: Patient consented to telephone visit.\par Time: A total of25 minutes was spent in review of pertinent medical records, discussion with the patient, evaluation of patient problem, and coordination of a care plan as part of this telephone visit.\par Physical examination was not performed as a  the risk of COVID-19 cross-contamination to be greater than the benefit to the patient conferred by the physical examination.\par \par 70-year-old had a 2 way audio visit with me.  She is at home.  I am at [facility].\par Reviewed labs, her blood pressure readings and lower extremity ultrasound reports.\par She is continue intermittent cramps in the right leg.\par She has no new complaint of chest pain.  There is no PND orthopnea.\par Limited functional status\par Stable exertional dyspnea\par Intermittent palpitation which has not changed\par No near syncopal or syncopal event\par No hospital admission\par No other changes in medications\par

## 2020-11-30 NOTE — ASSESSMENT
[FreeTextEntry1] : Reviewed on April 29, 2019\par EKG normal sinus rhythm, incomplete right bundle branch block.\par Labs TSH 1.34, April 9, 2019\par Labs March 20, 2019 p.o. hemoglobin 13.2.\par Creatinine 1.0, BUN 18, potassium 4.2, sodium 142.\par \par Reviewed on July 26, 2019\par Labs from May 30, 2019 had shown normal. BNP, HDL 80 .\par Echocardiogram June 18, 2019 and ejection fraction 60% mild mitral regurgitation\par Carotid Doppler study June 18, 2019 mild carotid atherosclerosis nonobstructive.\par Exercise treadmill stress test low level of exercise. Suboptimal.\par Nuclear marker perfusion scan could not be completed because of technical difficulty\par \par Reviewed on August 23, 2019\par Coronary CTA, August 15, 2019. No active pulmonary disease.\par Coronary calcium score 106.\par Atherosclerosis changes with no hemodynamically significant stenosis. Mixture of calcified and noncalcified plaque in proximal and midportion of the LAD, left circumflex/OM one, right coronary artery.\par \par Reviewed October 30, 2020.\par GORDO/PVR study.  Left lower extremity noncompressible.  Right could not be evaluated.\par Labs which were done recently showed stable  CMP and lipid panel.\par \par Reviewed November 30, 2020\par Right lower extremity Doppler ultrasound showed a high-grade distal right common femoral/superficial femoral artery stenosis.\par Labs from November 23, 2020 sodium 135 potassium 3.7 creatinine 1.05

## 2020-12-08 ENCOUNTER — APPOINTMENT (OUTPATIENT)
Dept: RADIOLOGY | Facility: CLINIC | Age: 70
End: 2020-12-08
Payer: MEDICARE

## 2020-12-08 PROCEDURE — 77080 DXA BONE DENSITY AXIAL: CPT

## 2020-12-10 ENCOUNTER — APPOINTMENT (OUTPATIENT)
Dept: MAMMOGRAPHY | Facility: CLINIC | Age: 70
End: 2020-12-10
Payer: MEDICARE

## 2020-12-10 ENCOUNTER — APPOINTMENT (OUTPATIENT)
Dept: ULTRASOUND IMAGING | Facility: CLINIC | Age: 70
End: 2020-12-10

## 2020-12-10 PROCEDURE — 76641 ULTRASOUND BREAST COMPLETE: CPT | Mod: LT

## 2020-12-10 PROCEDURE — G0279: CPT | Mod: LT

## 2020-12-10 PROCEDURE — 77065 DX MAMMO INCL CAD UNI: CPT | Mod: LT

## 2020-12-22 ENCOUNTER — APPOINTMENT (OUTPATIENT)
Dept: CARDIOLOGY | Facility: CLINIC | Age: 70
End: 2020-12-22

## 2021-01-25 ENCOUNTER — RESULT CHARGE (OUTPATIENT)
Age: 71
End: 2021-01-25

## 2021-01-26 ENCOUNTER — APPOINTMENT (OUTPATIENT)
Dept: CARDIOLOGY | Facility: CLINIC | Age: 71
End: 2021-01-26
Payer: MEDICARE

## 2021-01-26 VITALS
WEIGHT: 166 LBS | OXYGEN SATURATION: 100 % | DIASTOLIC BLOOD PRESSURE: 64 MMHG | HEART RATE: 84 BPM | SYSTOLIC BLOOD PRESSURE: 132 MMHG | BODY MASS INDEX: 26.68 KG/M2 | HEIGHT: 66 IN

## 2021-01-26 DIAGNOSIS — R06.02 SHORTNESS OF BREATH: ICD-10-CM

## 2021-01-26 DIAGNOSIS — R07.89 OTHER CHEST PAIN: ICD-10-CM

## 2021-01-26 PROCEDURE — 99215 OFFICE O/P EST HI 40 MIN: CPT

## 2021-01-26 RX ORDER — ASPIRIN 81 MG/1
81 TABLET, COATED ORAL EVERY OTHER DAY
Refills: 0 | Status: DISCONTINUED | COMMUNITY
Start: 2020-07-31 | End: 2021-01-26

## 2021-01-26 NOTE — DISCUSSION/SUMMARY
[FreeTextEntry1] : \par \par 70-year-old F referred for peripheral arterial disease by lower extremity lifestyle limiting claudication at mild to moderate exertion.  Does not use tobacco.  Denies angina orthopnea.  Stable exertional dyspnea, follows with pulmonology. Lower extremity arterial ultrasound significant for high-grade stenosis of the right distal CFA/proximal SFA.  Lab work reviewed.  She is unable to tolerate aspirin at present due to significant hemorrhoidal bleeding, we will continue to reassess.  She can tolerate DAPT post peripheral intervention is needed. No ulcers.  No crescendo or rest pain.  Left lower extremity not affected and GORDO normal on left.\par \par \par 1.  Labs reviewed.  Mild hyponatremia elevated BUN.  Increase fluid intake.  Continue the Decreased chlorthalidone to 12.5 mg.\par 2.  HTN better controlled.  Compliance with salt decrease has been noted.  Follow-up blood pressure in the office.  If tolerated and improved blood pressure control we can combine it with her irbesartan.  If remains uncontrolled combination of ARB, calcium channel blocker like amlodipine and thiazide diuretics would be wise. Continue low-salt diet increasing exercise and lower alcohol intake recommended. She prefers to maintain current medication regimen.\par 3.  Claudication pain. Lifestyle limiting Chani IIb RLE calf claudication. Significant stenosis on Doppler study involving distal right common femoral and superficial femoral artery. Offered her peripheral angiogram + PVI if indicated however she would like to schedule for 6 months as she is moving currently and needs to lift. No emergent indication. Continue statin. She will attempt to tolerate aspirin (hemorrhoidal bleeding). \par 4.  Dyslipidemia.  Very well controlled continue with atorvastatin\par 5.  Hypothyroidism being followed with primary care physician\par 6.  Dyspnea.  Mild emphysema.  Unable to tolerate inhalers.\par 7.  Nonobstructive coronary artery disease.  Continue lifestyle and risk factor modifications.  Continue statin aspirin and risk factor modification\par \par Right carotid bruit: carotid duplex ordered. TTE ordered given dyspnea although stable but last 2 years ago. \par \par Follow-up in 6 months and as needed.  I will call patient with carotid duplex and TTE results.She will call office if she wants to expedite this appointment and if she would like procedure prior to this.\par \par

## 2021-01-26 NOTE — ASSESSMENT
[FreeTextEntry1] : Reviewed on April 29, 2019\par EKG normal sinus rhythm, incomplete right bundle branch block.\par Labs TSH 1.34, April 9, 2019\par Labs March 20, 2019 p.o. hemoglobin 13.2.\par Creatinine 1.0, BUN 18, potassium 4.2, sodium 142.\par \par Reviewed on July 26, 2019\par Labs from May 30, 2019 had shown normal. BNP, HDL 80 .\par Echocardiogram June 18, 2019 and ejection fraction 60% mild mitral regurgitation\par Carotid Doppler study June 18, 2019 mild carotid atherosclerosis nonobstructive.\par Exercise treadmill stress test low level of exercise. Suboptimal.\par Nuclear marker perfusion scan could not be completed because of technical difficulty\par \par Reviewed on August 23, 2019\par Coronary CTA, August 15, 2019. No active pulmonary disease.\par Coronary calcium score 106.\par Atherosclerosis changes with no hemodynamically significant stenosis. Mixture of calcified and noncalcified plaque in proximal and midportion of the LAD, left circumflex/OM one, right coronary artery.\par \par Reviewed October 30, 2020.\par GORDO/PVR study.  Left lower extremity noncompressible.  Right could not be evaluated.\par Labs which were done recently showed stable  CMP and lipid panel.\par \par Reviewed November 30, 2020\par Right lower extremity Doppler ultrasound showed a high-grade distal right common femoral/superficial femoral artery stenosis.\par Labs from November 23, 2020 sodium 135 potassium 3.7 creatinine 1.05 no

## 2021-01-26 NOTE — PHYSICAL EXAM
[General Appearance - Well Developed] : well developed [Normal Appearance] : normal appearance [Well Groomed] : well groomed [General Appearance - Well Nourished] : well nourished [No Deformities] : no deformities [General Appearance - In No Acute Distress] : no acute distress [Normal Conjunctiva] : the conjunctiva exhibited no abnormalities [Eyelids - No Xanthelasma] : the eyelids demonstrated no xanthelasmas [Normal Oral Mucosa] : normal oral mucosa [No Oral Pallor] : no oral pallor [No Oral Cyanosis] : no oral cyanosis [Normal Jugular Venous V Waves Present] : normal jugular venous V waves present [No Jugular Venous Gonzalez A Waves] : no jugular venous gonzalez A waves [Respiration, Rhythm And Depth] : normal respiratory rhythm and effort [Exaggerated Use Of Accessory Muscles For Inspiration] : no accessory muscle use [Auscultation Breath Sounds / Voice Sounds] : lungs were clear to auscultation bilaterally [Heart Sounds] : normal S1 and S2 [Heart Rate And Rhythm] : heart rate and rhythm were normal [Bowel Sounds] : normal bowel sounds [Abdomen Soft] : soft [Abdomen Tenderness] : non-tender [Abnormal Walk] : normal gait [Gait - Sufficient For Exercise Testing] : the gait was sufficient for exercise testing [Nail Clubbing] : no clubbing of the fingernails [Cyanosis, Localized] : no localized cyanosis [Petechial Hemorrhages (___cm)] : no petechial hemorrhages [Skin Color & Pigmentation] : normal skin color and pigmentation [] : no rash [No Venous Stasis] : no venous stasis [Skin Lesions] : no skin lesions [No Skin Ulcers] : no skin ulcer [No Xanthoma] : no  xanthoma was observed [Affect] : the affect was normal [Oriented To Time, Place, And Person] : oriented to person, place, and time [Mood] : the mood was normal [No Anxiety] : not feeling anxious [FreeTextEntry1] : Full range of motion

## 2021-01-26 NOTE — REASON FOR VISIT
[Hyperlipidemia] : hyperlipidemia [Hypertension] : hypertension [Peripheral Vascular Disease] : peripheral vascular disease [Medication Management] : Medication management [FreeTextEntry2] : Peripheral arterial disease, hypertension, hyperlipidemia [FreeTextEntry1] : \par 70-year-old F referred for peripheral arterial disease by lower extremity lifestyle limiting claudication at mild to moderate exertion.  Does not use tobacco.  Denies angina orthopnea.  Stable exertional dyspnea, follows with pulmonology.\par \par Lower extremity arterial ultrasound significant for high-grade stenosis of the right distal CFA/proximal SFA.  Lab work reviewed.  She is unable to tolerate aspirin at present due to significant hemorrhoidal bleeding, we will continue to reassess.  She can tolerate DAPT post peripheral intervention is needed.\par \par No ulcers.  No crescendo or rest pain.  Left lower extremity not affected and GORDO normal on left

## 2021-01-26 NOTE — HISTORY OF PRESENT ILLNESS
[FreeTextEntry1] : Her medical history includes\par #1 essential hypertension.\par #2 stage IA papillary thyroid carcinoma, complete thyroidectomy\par #3 \par #4 history of right breast cancer, status post right mastectomy, ER positive breast cancer stage IC in 1996.\par #5 severe gastroesophageal reflux disease.\par #6 osteoporosis.\par #7 history of relapsing mononucleosis\par Peripheral arterial disease

## 2021-02-24 ENCOUNTER — APPOINTMENT (OUTPATIENT)
Dept: CARDIOLOGY | Facility: CLINIC | Age: 71
End: 2021-02-24
Payer: MEDICARE

## 2021-02-24 PROCEDURE — 93880 EXTRACRANIAL BILAT STUDY: CPT

## 2021-02-24 PROCEDURE — 93306 TTE W/DOPPLER COMPLETE: CPT

## 2021-04-15 ENCOUNTER — NON-APPOINTMENT (OUTPATIENT)
Age: 71
End: 2021-04-15

## 2021-04-19 ENCOUNTER — NON-APPOINTMENT (OUTPATIENT)
Age: 71
End: 2021-04-19

## 2021-08-04 ENCOUNTER — NON-APPOINTMENT (OUTPATIENT)
Age: 71
End: 2021-08-04

## 2021-08-04 ENCOUNTER — APPOINTMENT (OUTPATIENT)
Dept: CARDIOLOGY | Facility: CLINIC | Age: 71
End: 2021-08-04
Payer: MEDICARE

## 2021-08-04 VITALS — SYSTOLIC BLOOD PRESSURE: 150 MMHG | DIASTOLIC BLOOD PRESSURE: 78 MMHG

## 2021-08-04 VITALS — WEIGHT: 160 LBS | HEIGHT: 66 IN | HEART RATE: 77 BPM | BODY MASS INDEX: 25.71 KG/M2 | OXYGEN SATURATION: 100 %

## 2021-08-04 PROCEDURE — 93000 ELECTROCARDIOGRAM COMPLETE: CPT

## 2021-08-04 PROCEDURE — 99215 OFFICE O/P EST HI 40 MIN: CPT

## 2021-08-04 NOTE — REASON FOR VISIT
[Symptom and Test Evaluation] : symptom and test evaluation [Hyperlipidemia] : hyperlipidemia [Hypertension] : hypertension [Peripheral Vascular Disease] : peripheral vascular disease [FreeTextEntry1] : 71-year-old female comes in for follow-up consultation.  Reviewed echocardiogram, carotid Doppler study and evaluation management by cardiovascular interventional physician.\par Continues to have intermittent claudication pain at walking 200 yards distance which gets better after stopping for few minutes there is no resting symptoms.  There is no change in the color of the extremities.\par She did not underwent any procedures as recommended based on vascular ultrasound few months ago.  There is no worsening of her claudication distance.\par Does not do regular exercises as recommended.\par \par She saw pulmonologist.  Recommended inhaler.  According to her she had significant side effects.  And stopped taking it.\par \par She could not tolerate every day atorvastatin and chlorthalidone.  Taking it every other day.\par She is continue intermittent cramps in the right leg.\par She has no new complaint of chest pain.  There is no PND orthopnea.\par Limited functional status\par Intermittent palpitation which has not changed\par No near syncopal or syncopal event\par No hospital admission\par No other changes in medications\par  [FreeTextEntry2] : 2 way audio visit to review labs, hypertwnsion, PAD

## 2021-08-04 NOTE — PHYSICAL EXAM
[Well Developed] : well developed [Well Nourished] : well nourished [No Acute Distress] : no acute distress [Normal Conjunctiva] : normal conjunctiva [Normal Venous Pressure] : normal venous pressure [No Carotid Bruit] : no carotid bruit [Normal S1, S2] : normal S1, S2 [No Rub] : no rub [No Gallop] : no gallop [Clear Lung Fields] : clear lung fields [Good Air Entry] : good air entry [No Respiratory Distress] : no respiratory distress  [Soft] : abdomen soft [Non Tender] : non-tender [No Masses/organomegaly] : no masses/organomegaly [Normal Bowel Sounds] : normal bowel sounds [Normal Gait] : normal gait [No Edema] : no edema [No Cyanosis] : no cyanosis [No Clubbing] : no clubbing [No Varicosities] : no varicosities [No Rash] : no rash [No Skin Lesions] : no skin lesions [Moves all extremities] : moves all extremities [No Focal Deficits] : no focal deficits [Normal Speech] : normal speech [Alert and Oriented] : alert and oriented [Normal memory] : normal memory [Normal Radial B/L] : normal radial B/L [Diminished Pedal Pulses ___] : diminished pedal pulses [unfilled] [de-identified] : Systolic murmur 1/6 at the base.

## 2021-08-04 NOTE — DISCUSSION/SUMMARY
[FreeTextEntry1] : 71-year-old female with above medical history active medical problems as noted below\par 1.  Peripheral arterial disease.  Continued right lower extremity claudication pain.  No ischemic changes.  No rest symptoms.  No change in claudication distance which is at around cm 100 m.\par Does not want to take aspirin as she has intermittent need for iron infusion and hemorrhoidal bleeding.  Recommended to discuss this with hematologist strongly recommend to consider use of aspirin.\par Strongly recommend to take statins every day.  She will think about it.\par Recommended labs which includes lipid panel\par 2.  Uncontrolled hypertension.  Compliance with salt alcohol medication has been noted.  She does not want to take chlorthalidone every day.  At present added amlodipine 5 mg to the present regimen.  Risk benefits alternative and side effects reviewed.  She does not check her blood pressure at home.  Her goal is less than 130/80.\par 3.   Dyslipidemia.  Very well controlled continue with atorvastatin\par 4..  Dyspnea.  Mild emphysema.  Unable to tolerate inhalers.  Follow-up with pulmonologist.\par 5.  Nonobstructive coronary artery disease.  Continue lifestyle and risk factor modifications.  Continue statin and risk factor modification.  Again reviewed indication for aspirin.\par \par Counseling regarding low saturated fat, salt and carbohydrate intake was reviewed. Active lifestyle and regular. Exercise along with weight management is advised.\par All the above were at length reviewed. Answered all the questions. Thank you very much for this kind referral. Please do not hesitate to give me a call for any question.\par Part of this transcription was done with voice recognition software and phonetically similar errors are common. I apologize for that. Please do not hesitate to call for any questions due to above.\par \par \par

## 2021-08-04 NOTE — ASSESSMENT
[FreeTextEntry1] : Reviewed on April 29, 2019\par EKG normal sinus rhythm, incomplete right bundle branch block.\par Labs TSH 1.34, April 9, 2019\par Labs March 20, 2019 p.o. hemoglobin 13.2.\par Creatinine 1.0, BUN 18, potassium 4.2, sodium 142.\par \par Reviewed on July 26, 2019\par Labs from May 30, 2019 had shown normal. BNP, HDL 80 .\par Echocardiogram June 18, 2019 and ejection fraction 60% mild mitral regurgitation\par Carotid Doppler study June 18, 2019 mild carotid atherosclerosis nonobstructive.\par Exercise treadmill stress test low level of exercise. Suboptimal.\par Nuclear marker perfusion scan could not be completed because of technical difficulty\par \par Reviewed on August 23, 2019\par Coronary CTA, August 15, 2019. No active pulmonary disease.\par Coronary calcium score 106.\par Atherosclerosis changes with no hemodynamically significant stenosis. Mixture of calcified and noncalcified plaque in proximal and midportion of the LAD, left circumflex/OM one, right coronary artery.\par \par Reviewed October 30, 2020.\par GORDO/PVR study.  Left lower extremity noncompressible.  Right could not be evaluated.\par Labs which were done recently showed stable  CMP and lipid panel.\par \par Reviewed November 30, 2020\par Right lower extremity Doppler ultrasound showed a high-grade distal right common femoral/superficial femoral artery stenosis.\par Labs from November 23, 2020 sodium 135 potassium 3.7 creatinine 1.05\par \par Reviewed August 4, 2021.\par Echocardiogram/carotid Doppler study from February 2021 were reviewed.\par Right lower extremity ultrasound November 10, 2020 was reviewed.\par

## 2021-08-04 NOTE — REVIEW OF SYSTEMS
[Negative] : Heme/Lymph [Feeling Fatigued] : feeling fatigued [SOB] : shortness of breath [Dyspnea on exertion] : dyspnea during exertion [Leg Claudication] : intermittent leg claudication [Joint Pain] : joint pain [Joint Stiffness] : joint stiffness [Fever] : no fever [Weight Gain (___ Lbs)] : no recent weight gain [Chills] : no chills [Weight Loss (___ Lbs)] : no recent weight loss [Chest Discomfort] : no chest discomfort [Lower Ext Edema] : no extremity edema [Palpitations] : no palpitations [Orthopnea] : no orthopnea [PND] : no PND [Syncope] : no syncope [Cough] : no cough [Wheezing] : no wheezing [Coughing Up Blood] : no hemoptysis [Snoring] : no snoring

## 2021-08-04 NOTE — CARDIOLOGY SUMMARY
[de-identified] : 8/4/2021 normal sinus rhythm incomplete right bundle branch block [de-identified] : February 24, 2021 EF 55.  Mild mitral regurgitation.  Normal left atrium.  Mild tricuspid regurgitation.  PASP 30 mmHg [de-identified] : Cardiac coronary CT angiography.  2019.  Atherosclerotic nonobstructive disease.  Coronary calcium score 106. [de-identified] : GORDO/PVR study.  Right could not be evaluated.  Left thigh noncompressible left PT/DP borderline for mild disease.\par Carotid Doppler study.  February 24, 2021.  Mild nonobstructive disease bilaterally.\par November 10, 2020.  Right lower extremity atherosclerotic plaque with high-grade distal right common femoral/superficial femoral artery stenosis.

## 2021-08-05 ENCOUNTER — APPOINTMENT (OUTPATIENT)
Dept: ULTRASOUND IMAGING | Facility: CLINIC | Age: 71
End: 2021-08-05
Payer: MEDICARE

## 2021-08-05 PROCEDURE — 76536 US EXAM OF HEAD AND NECK: CPT

## 2021-12-13 ENCOUNTER — APPOINTMENT (OUTPATIENT)
Dept: CARDIOLOGY | Facility: CLINIC | Age: 71
End: 2021-12-13
Payer: MEDICARE

## 2021-12-13 VITALS
SYSTOLIC BLOOD PRESSURE: 142 MMHG | HEART RATE: 72 BPM | OXYGEN SATURATION: 100 % | BODY MASS INDEX: 26.03 KG/M2 | WEIGHT: 162 LBS | DIASTOLIC BLOOD PRESSURE: 60 MMHG | HEIGHT: 66 IN

## 2021-12-13 VITALS — DIASTOLIC BLOOD PRESSURE: 56 MMHG | SYSTOLIC BLOOD PRESSURE: 128 MMHG

## 2021-12-13 PROCEDURE — 99214 OFFICE O/P EST MOD 30 MIN: CPT

## 2021-12-13 NOTE — ASSESSMENT
[FreeTextEntry1] : Reviewed on April 29, 2019\par EKG normal sinus rhythm, incomplete right bundle branch block.\par Labs TSH 1.34, April 9, 2019\par Labs March 20, 2019 p.o. hemoglobin 13.2.\par Creatinine 1.0, BUN 18, potassium 4.2, sodium 142.\par \par Reviewed on July 26, 2019\par Labs from May 30, 2019 had shown normal. BNP, HDL 80 .\par Echocardiogram June 18, 2019 and ejection fraction 60% mild mitral regurgitation\par Carotid Doppler study June 18, 2019 mild carotid atherosclerosis nonobstructive.\par Exercise treadmill stress test low level of exercise. Suboptimal.\par Nuclear marker perfusion scan could not be completed because of technical difficulty\par \par Reviewed on August 23, 2019\par Coronary CTA, August 15, 2019. No active pulmonary disease.\par Coronary calcium score 106.\par Atherosclerosis changes with no hemodynamically significant stenosis. Mixture of calcified and noncalcified plaque in proximal and midportion of the LAD, left circumflex/OM one, right coronary artery.\par \par Reviewed October 30, 2020.\par GORDO/PVR study.  Left lower extremity noncompressible.  Right could not be evaluated.\par Labs which were done recently showed stable  CMP and lipid panel.\par \par Reviewed November 30, 2020\par Right lower extremity Doppler ultrasound showed a high-grade distal right common femoral/superficial femoral artery stenosis.\par Labs from November 23, 2020 sodium 135 potassium 3.7 creatinine 1.05\par \par Reviewed August 4, 2021.\par Echocardiogram/carotid Doppler study from February 2021 were reviewed.\par Right lower extremity ultrasound November 10, 2020 was reviewed.\par \par Reviewed on December 13, 2021.\par Most recent labs September 22, 2021.  LDL 88 HDL 91 triglycerides 70 creatinine 1.1 sodium 139 potassium 4.0 LFT normal\par \par

## 2021-12-13 NOTE — REVIEW OF SYSTEMS
[Feeling Fatigued] : feeling fatigued [SOB] : shortness of breath [Dyspnea on exertion] : dyspnea during exertion [Leg Claudication] : intermittent leg claudication [Palpitations] : palpitations [Joint Pain] : joint pain [Joint Stiffness] : joint stiffness [Negative] : Heme/Lymph [Fever] : no fever [Weight Gain (___ Lbs)] : no recent weight gain [Chills] : no chills [Weight Loss (___ Lbs)] : no recent weight loss [Chest Discomfort] : no chest discomfort [Lower Ext Edema] : no extremity edema [Orthopnea] : no orthopnea [PND] : no PND [Syncope] : no syncope [Cough] : no cough [Wheezing] : no wheezing [Coughing Up Blood] : no hemoptysis [Snoring] : no snoring

## 2021-12-13 NOTE — PHYSICAL EXAM
[Well Developed] : well developed [Well Nourished] : well nourished [No Acute Distress] : no acute distress [Normal Conjunctiva] : normal conjunctiva [Normal Venous Pressure] : normal venous pressure [No Carotid Bruit] : no carotid bruit [Normal S1, S2] : normal S1, S2 [No Rub] : no rub [No Gallop] : no gallop [Clear Lung Fields] : clear lung fields [Good Air Entry] : good air entry [No Respiratory Distress] : no respiratory distress  [Normal Gait] : normal gait [No Edema] : no edema [No Cyanosis] : no cyanosis [No Clubbing] : no clubbing [No Varicosities] : no varicosities [Normal Radial B/L] : normal radial B/L [Diminished Pedal Pulses ___] : diminished pedal pulses [unfilled] [No Rash] : no rash [No Skin Lesions] : no skin lesions [Moves all extremities] : moves all extremities [No Focal Deficits] : no focal deficits [Normal Speech] : normal speech [Alert and Oriented] : alert and oriented [Normal memory] : normal memory [de-identified] : Systolic murmur 1/6 at the base.

## 2021-12-13 NOTE — DISCUSSION/SUMMARY
[FreeTextEntry1] : 71-year-old female with above medical history active medical problems as noted below\par 1.  Peripheral arterial disease.  Continued right lower extremity claudication pain.  No ischemic changes.  No rest symptoms.  No change in claudication distance which is at around 100-200 m.\par Has been recommended by hematologist not to take any antiplatelet agents including aspirin.  \par Recommended to take atorvastatin 80 mg.  Hopefully daily.  Repeat labs ordered.\par Understands importance of statin therapy.\par Without antiplatelet agent she is at higher risk of complication related to peripheral arterial disease\par 2.  Essential hypertension.  Stable today.  Recommend to continue present regimen medication.  Nonpharmacological therapy including walking has been discussed.\par 3.   Dyslipidemia.  Very well controlled continue with atorvastatin\par 4..  Dyspnea.  Mild emphysema.  Unable to tolerate inhalers.  Follow-up with pulmonologist.\par 5.  Nonobstructive coronary artery disease.  Continue lifestyle and risk factor modifications.  Continue statin and risk factor modification.  Again reviewed indication for aspirin which she is unable to take.  Limitation of management discussed.  Higher dose of statin therapy recommended.\par #6 intermittent palpitations.  New onset.  7-day event monitor.  Based on that we can discuss further regarding management.  If any high risk symptoms which have been reviewed she will call 911 and go to the nearest emergency room\par \par Counseling regarding low saturated fat, salt and carbohydrate intake was reviewed. Active lifestyle and regular. Exercise along with weight management is advised.\par All the above were at length reviewed. Answered all the questions. Thank you very much for this kind referral. Please do not hesitate to give me a call for any question.\par Part of this transcription was done with voice recognition software and phonetically similar errors are common. I apologize for that. Please do not hesitate to call for any questions due to above.\par \par \par

## 2021-12-13 NOTE — CARDIOLOGY SUMMARY
[de-identified] : 8/4/2021 normal sinus rhythm incomplete right bundle branch block [de-identified] : February 24, 2021 EF 55.  Mild mitral regurgitation.  Normal left atrium.  Mild tricuspid regurgitation.  PASP 30 mmHg [de-identified] : Cardiac coronary CT angiography.  2019.  Atherosclerotic nonobstructive disease.  Coronary calcium score 106. [de-identified] : GORDO/PVR study.  Right could not be evaluated.  Left thigh noncompressible left PT/DP borderline for mild disease.\par Carotid Doppler study.  February 24, 2021.  Mild nonobstructive disease bilaterally.\par November 10, 2020.  Right lower extremity atherosclerotic plaque with high-grade distal right common femoral/superficial femoral artery stenosis.

## 2021-12-21 ENCOUNTER — NON-APPOINTMENT (OUTPATIENT)
Age: 71
End: 2021-12-21

## 2022-01-24 ENCOUNTER — APPOINTMENT (OUTPATIENT)
Dept: CARDIOLOGY | Facility: CLINIC | Age: 72
End: 2022-01-24
Payer: MEDICARE

## 2022-01-24 VITALS
BODY MASS INDEX: 26.52 KG/M2 | OXYGEN SATURATION: 100 % | SYSTOLIC BLOOD PRESSURE: 138 MMHG | TEMPERATURE: 97.7 F | WEIGHT: 165 LBS | HEART RATE: 82 BPM | DIASTOLIC BLOOD PRESSURE: 62 MMHG | HEIGHT: 66 IN

## 2022-01-24 DIAGNOSIS — K21.9 GASTRO-ESOPHAGEAL REFLUX DISEASE W/OUT ESOPHAGITIS: ICD-10-CM

## 2022-01-24 DIAGNOSIS — I45.10 UNSPECIFIED RIGHT BUNDLE-BRANCH BLOCK: ICD-10-CM

## 2022-01-24 PROCEDURE — 99214 OFFICE O/P EST MOD 30 MIN: CPT

## 2022-01-24 RX ORDER — ESOMEPRAZOLE MAGNESIUM 40 MG/1
40 CAPSULE, DELAYED RELEASE ORAL DAILY
Refills: 0 | Status: DISCONTINUED | COMMUNITY
End: 2022-01-24

## 2022-01-25 NOTE — HISTORY OF PRESENT ILLNESS
[FreeTextEntry1] : ITALIA MALDONADO  is a 71 year F  who presents today Jan 24, 2022 in clinical follow-up. Continues to have palpitations with activity and eating. ZIO monitor demonstrated multiple episodes of SVT with short duration. Placed on Metoprolol 25mg QD with no improvement in symptoms. There is a dry non-productive cough since the fall. Following with pulmonary Dr. Rohit Betts - clinical follow-up scheduled next week. Confusion regarding prior Atorvastatin dosing. Taking 80mg every other day. Requesting to try taking 40mg QD. \par Intolerant to ASA 81mg in the past with increased bleeding and hemorrhoids - states Dr. Herrera instructed her not to take in the future. \par Continues to have intermittent claudication pain at walking 200 yards distance which gets better after stopping for few minutes there is no resting symptoms.  \par \par Today she denies chest pain, pressure, unusual shortness of breath, lightheadedness, dizziness, near syncope or syncope. \par \par \par Her medical history includes\par essential hypertension.\par \par stage IA papillary thyroid carcinoma, complete thyroidectomy\par \par History of right breast cancer, status post right mastectomy, ER positive breast cancer stage IC in 1996.\par \par severe gastroesophageal reflux disease.\par \par osteoporosis.\par \par history of relapsing mononucleosis

## 2022-01-25 NOTE — CARDIOLOGY SUMMARY
[de-identified] : 8/4/2021 normal sinus rhythm incomplete right bundle branch block [de-identified] : February 24, 2021 EF 55.  Mild mitral regurgitation.  Normal left atrium.  Mild tricuspid regurgitation.  PASP 30 mmHg [de-identified] : Cardiac coronary CT angiography.  2019.  Atherosclerotic nonobstructive disease.  Coronary calcium score 106. [de-identified] : GORDO/PVR study.  Right could not be evaluated.  Left thigh noncompressible left PT/DP borderline for mild disease.\par Carotid Doppler study.  February 24, 2021.  Mild nonobstructive disease bilaterally.\par November 10, 2020.  Right lower extremity atherosclerotic plaque with high-grade distal right common femoral/superficial femoral artery stenosis.

## 2022-01-25 NOTE — DISCUSSION/SUMMARY
[FreeTextEntry1] : ITALIA MALDONADO  is a 71 year F  who presents today Jan 24, 2022 with the above history and the following active issues. \par \par Peripheral arterial disease.  Continued right lower extremity claudication pain.  No ischemic changes.  No rest symptoms.  No change in claudication distance which is at around 100-200 m.\par Has been recommended by hematologist not to take any antiplatelet agents including aspirin.  \par Recommended to take atorvastatin 40 mg QD. Consider using Cilostazol - patient will research and let us know. \par Without antiplatelet agent she is at higher risk of complication related to peripheral arterial disease\par \par Essential hypertension.  Stable today.  Recommend to continue present regimen medication.  Nonpharmacological therapy including walking has been discussed.\par \par Dyslipidemia.  Very well controlled continue with atorvastatin\par \par Dyspnea.  Mild emphysema.  Unable to tolerate inhalers.  Follow-up with pulmonologist.\par \par Nonobstructive coronary artery disease.  Continue lifestyle and risk factor modifications.  Continue statin and risk factor modification.  Again reviewed indication for aspirin which she is unable to take.  Limitation of management discussed.  Higher dose of statin therapy recommended.\par \par SVT/palpitations.  Recommend increasing Metoprolol to 50mg QD. Consultation with EP recommended. \par \par Red flag symptoms which would warrant sooner emergent evaluation reviewed with the patient. \par Questions and concerns were addressed and answered. \par Limitations of non-invasive testing reviewed\par \par Sincerely,\par \par Jasmyn Orourke PA-C\par Patients history, testing and plan reviewed with supervising MD: Dr. Sabine Betts \par

## 2022-01-25 NOTE — ASSESSMENT
[FreeTextEntry1] : Reviewed on April 29, 2019\par EKG normal sinus rhythm, incomplete right bundle branch block.\par Labs TSH 1.34, April 9, 2019\par Labs March 20, 2019 p.o. hemoglobin 13.2.\par Creatinine 1.0, BUN 18, potassium 4.2, sodium 142.\par \par Labs from May 30, 2019 had shown normal. BNP, HDL 80 .\par Echocardiogram June 18, 2019 and ejection fraction 60% mild mitral regurgitation\par Carotid Doppler study June 18, 2019 mild carotid atherosclerosis nonobstructive.\par Exercise treadmill stress test low level of exercise. Suboptimal.\par Nuclear marker perfusion scan could not be completed because of technical difficulty\par \par Coronary CTA, August 15, 2019. No active pulmonary disease.\par Coronary calcium score 106.\par Atherosclerosis changes with no hemodynamically significant stenosis. Mixture of calcified and noncalcified plaque in proximal and midportion of the LAD, left circumflex/OM one, right coronary artery.\par \par GORDO/PVR study.  Left lower extremity noncompressible.  Right could not be evaluated.\par Labs which were done recently showed stable  CMP and lipid panel.\par \par Right lower extremity Doppler ultrasound showed a high-grade distal right common femoral/superficial femoral artery stenosis.\par Labs from November 23, 2020 sodium 135 potassium 3.7 creatinine 1.05\par \par Echocardiogram/carotid Doppler study from February 2021 were reviewed.\par Right lower extremity ultrasound November 10, 2020 was reviewed.\par \par \par labs September 22, 2021.  LDL 88 HDL 91 triglycerides 70 creatinine 1.1 sodium 139 potassium 4.0 LFT normal\par \par

## 2022-02-08 ENCOUNTER — APPOINTMENT (OUTPATIENT)
Dept: ULTRASOUND IMAGING | Facility: CLINIC | Age: 72
End: 2022-02-08

## 2022-02-08 ENCOUNTER — APPOINTMENT (OUTPATIENT)
Dept: MAMMOGRAPHY | Facility: CLINIC | Age: 72
End: 2022-02-08
Payer: MEDICARE

## 2022-02-08 PROCEDURE — 77067 SCR MAMMO BI INCL CAD: CPT | Mod: 52,LT

## 2022-02-08 PROCEDURE — 77063 BREAST TOMOSYNTHESIS BI: CPT | Mod: 52,LT

## 2022-02-08 PROCEDURE — 76642 ULTRASOUND BREAST LIMITED: CPT | Mod: LT

## 2022-04-18 ENCOUNTER — APPOINTMENT (OUTPATIENT)
Dept: CARDIOLOGY | Facility: CLINIC | Age: 72
End: 2022-04-18
Payer: MEDICARE

## 2022-04-18 VITALS
WEIGHT: 165 LBS | SYSTOLIC BLOOD PRESSURE: 128 MMHG | DIASTOLIC BLOOD PRESSURE: 64 MMHG | HEART RATE: 80 BPM | OXYGEN SATURATION: 99 % | HEIGHT: 66 IN | BODY MASS INDEX: 26.52 KG/M2

## 2022-04-18 PROCEDURE — 99214 OFFICE O/P EST MOD 30 MIN: CPT

## 2022-04-18 RX ORDER — CHLORTHALIDONE 25 MG/1
25 TABLET ORAL
Qty: 45 | Refills: 3 | Status: DISCONTINUED | COMMUNITY
Start: 2020-10-30 | End: 2022-04-18

## 2022-04-18 RX ORDER — CHLORHEXIDINE GLUCONATE, 0.12% ORAL RINSE 1.2 MG/ML
0.12 SOLUTION DENTAL
Qty: 473 | Refills: 0 | Status: DISCONTINUED | COMMUNITY
Start: 2021-11-10 | End: 2022-04-18

## 2022-04-18 RX ORDER — PANTOPRAZOLE 40 MG/1
40 TABLET, DELAYED RELEASE ORAL DAILY
Qty: 90 | Refills: 3 | Status: DISCONTINUED | COMMUNITY
Start: 2022-01-24 | End: 2022-04-18

## 2022-04-18 NOTE — REASON FOR VISIT
[Symptom and Test Evaluation] : symptom and test evaluation [Hypertension] : hypertension [FreeTextEntry1] : 72-year-old female comes in for follow-up consultation to review medical management after recent changes.\par She has intermittent palpitation which has improved on metoprolol at 50 mg.  No associated dizziness near syncopal or syncopal event.\par Continues to have intermittent claudication pain at walking 200 yards distance which gets better after stopping for few minutes there is no resting symptoms.  There is no change in the color of the extremities.\par She did not underwent any procedures as recommended based on vascular ultrasound few months ago.  There is no worsening of her claudication distance.\par Does not do regular exercises as recommended.  She did not see vascular surgeon as advised.\par \par Her gastroesophageal reflux disease symptoms have remained the same.  She did not take pantoprazole as advised.  She was also recommended to see gastroenterologist.\par \par She saw pulmonologist.  Recommended inhaler.  According to her she had significant side effects.  And stopped taking it.\par \par She could not tolerate every day atorvastatin and chlorthalidone.  Taking it every other day.\par She is continue intermittent cramps in the right leg.\par She has no new complaint of chest pain.  There is no PND orthopnea.\par Limited functional status\par No near syncopal or syncopal event\par No hospital admission\par No other changes in medications\par She is unable to take aspirin from oncology/hematology point of view.\par

## 2022-04-18 NOTE — DISCUSSION/SUMMARY
[FreeTextEntry1] : 72-year-old female with above medical history active medical problems as noted below\par 1.  Peripheral arterial disease.  Continued right lower extremity claudication pain.  No ischemic changes.  No rest symptoms.  No change in claudication distance which is at around 100-200 m.\par Has been recommended by hematologist not to take any antiplatelet agents including aspirin.  \par Recommended to take atorvastatin 80 mg.  Hopefully daily.  Though she is taking it every other day.  Repeat labs ordered.\par Understands importance of statin therapy.\par Without antiplatelet agent she is at higher risk of complication related to peripheral arterial disease and associated morbidity mortality including limb ischemia and loss.\par Vascular surgical recommendation.\par 2.  Essential hypertension.  Stable today.  Recommend to continue present regimen medication.   nonpharmacological therapy including walking has been discussed.  Goal less than 140/90 preferably less than 130/80\par 3.   Dyslipidemia.  Very well controlled continue with atorvastatin\par 4..  Dyspnea.  Mild emphysema.  Unable to tolerate inhalers.  Follow-up with pulmonologist.\par 5.  Nonobstructive coronary artery disease.  Continue lifestyle and risk factor modifications.  Continue statin and risk factor modification.  Again reviewed indication for aspirin which she is unable to take.  Limitation of management discussed.  Higher dose of statin therapy recommended.\par 6 intermittent palpitations.  New onset.  Multiple episodes of PSVT/atrial tachycardia.  On metoprolol 50 mg.  Improved symptoms.  Recommended to follow\par 7.  Leg cramps.  Again with PAD.  Recommended consider calcium magnesium intermittent quinine use.  Follow-up with vascular surgery.  Less likely related to atorvastatin.\par \par Counseling regarding low saturated fat, salt and carbohydrate intake was reviewed. Active lifestyle and regular. Exercise along with weight management is advised.\par All the above were at length reviewed. Answered all the questions. Thank you very much for this kind referral. Please do not hesitate to give me a call for any question.\par Part of this transcription was done with voice recognition software and phonetically similar errors are common. I apologize for that. Please do not hesitate to call for any questions due to above.\par \par Sincerely,\par Sabine Betts MD,FACC,[email or web address]\par \par

## 2022-04-18 NOTE — ASSESSMENT
[FreeTextEntry1] : Reviewed on April 29, 2019\par EKG normal sinus rhythm, incomplete right bundle branch block.\par Labs TSH 1.34, April 9, 2019\par Labs March 20, 2019 p.o. hemoglobin 13.2.\par Creatinine 1.0, BUN 18, potassium 4.2, sodium 142.\par \par Reviewed on July 26, 2019\par Labs from May 30, 2019 had shown normal. BNP, HDL 80 .\par Echocardiogram June 18, 2019 and ejection fraction 60% mild mitral regurgitation\par Carotid Doppler study June 18, 2019 mild carotid atherosclerosis nonobstructive.\par Exercise treadmill stress test low level of exercise. Suboptimal.\par Nuclear marker perfusion scan could not be completed because of technical difficulty\par \par Reviewed on August 23, 2019\par Coronary CTA, August 15, 2019. No active pulmonary disease.\par Coronary calcium score 106.\par Atherosclerosis changes with no hemodynamically significant stenosis. Mixture of calcified and noncalcified plaque in proximal and midportion of the LAD, left circumflex/OM one, right coronary artery.\par \par Reviewed October 30, 2020.\par GORDO/PVR study.  Left lower extremity noncompressible.  Right could not be evaluated.\par Labs which were done recently showed stable  CMP and lipid panel.\par \par Reviewed November 30, 2020\par Right lower extremity Doppler ultrasound showed a high-grade distal right common femoral/superficial femoral artery stenosis.\par Labs from November 23, 2020 sodium 135 potassium 3.7 creatinine 1.05\par \par Reviewed August 4, 2021.\par Echocardiogram/carotid Doppler study from February 2021 were reviewed.\par Right lower extremity ultrasound November 10, 2020 was reviewed.\par \par Reviewed on December 13, 2021.\par Most recent labs September 22, 2021.  LDL 88 HDL 91 triglycerides 70 creatinine 1.1 sodium 139 potassium 4.0 LFT normal\par \par Reviewed on April 18, 2022.  Labs were done at outside facility.\par Labs from January 13, 2022 creatinine 1.14 sodium 138 potassium 3.9 LDL 93 HDL 90 triglycerides 85 total cholesterol 209

## 2022-04-18 NOTE — PHYSICAL EXAM
[Well Developed] : well developed [Well Nourished] : well nourished [No Acute Distress] : no acute distress [Normal S1, S2] : normal S1, S2 [No Rub] : no rub [No Gallop] : no gallop [Clear Lung Fields] : clear lung fields [Good Air Entry] : good air entry [No Respiratory Distress] : no respiratory distress  [Normal Gait] : normal gait [No Edema] : no edema [No Cyanosis] : no cyanosis [No Clubbing] : no clubbing [Normal Radial B/L] : normal radial B/L [Diminished Pedal Pulses ___] : diminished pedal pulses [unfilled] [Moves all extremities] : moves all extremities [Normal Speech] : normal speech [Alert and Oriented] : alert and oriented [de-identified] : Systolic murmur 1/6 at the base.

## 2022-04-18 NOTE — REVIEW OF SYSTEMS
[Feeling Fatigued] : feeling fatigued [SOB] : shortness of breath [Dyspnea on exertion] : dyspnea during exertion [Leg Claudication] : intermittent leg claudication [Palpitations] : palpitations [Joint Pain] : joint pain [Joint Stiffness] : joint stiffness [Negative] : Heme/Lymph [Fever] : no fever [Weight Gain (___ Lbs)] : no recent weight gain [Chills] : no chills [Weight Loss (___ Lbs)] : no recent weight loss [Lower Ext Edema] : no extremity edema [Chest Discomfort] : no chest discomfort [Orthopnea] : no orthopnea [PND] : no PND [Syncope] : no syncope [Cough] : no cough [Wheezing] : no wheezing [Coughing Up Blood] : no hemoptysis [Snoring] : no snoring [FreeTextEntry9] : Leg cramps

## 2022-04-18 NOTE — CARDIOLOGY SUMMARY
[de-identified] : 8/4/2021 normal sinus rhythm incomplete right bundle branch block [de-identified] : December 13, 2021.  Sinus rhythm 53 -118.  Average heart rate 79.  Multiple runs of atrial tachycardia.  Longest episode 27 seconds.  Highest rate around 200 bpm PAC burden 10.1% PVC burden less than 1%. [de-identified] : February 24, 2021 EF 55.  Mild mitral regurgitation.  Normal left atrium.  Mild tricuspid regurgitation.  PASP 30 mmHg [de-identified] : Cardiac coronary CT angiography.  2019.  Atherosclerotic nonobstructive disease.  Coronary calcium score 106. [de-identified] : GORDO/PVR study.  Right could not be evaluated.  Left thigh noncompressible left PT/DP borderline for mild disease.\par Carotid Doppler study.  February 24, 2021.  Mild nonobstructive disease bilaterally.\par November 10, 2020.  Right lower extremity atherosclerotic plaque with high-grade distal right common femoral/superficial femoral artery stenosis.

## 2022-07-14 ENCOUNTER — APPOINTMENT (OUTPATIENT)
Dept: RADIOLOGY | Facility: CLINIC | Age: 72
End: 2022-07-14

## 2022-07-14 ENCOUNTER — APPOINTMENT (OUTPATIENT)
Dept: ULTRASOUND IMAGING | Facility: CLINIC | Age: 72
End: 2022-07-14

## 2022-07-14 PROCEDURE — 71046 X-RAY EXAM CHEST 2 VIEWS: CPT

## 2022-07-14 PROCEDURE — 76536 US EXAM OF HEAD AND NECK: CPT

## 2022-10-17 ENCOUNTER — APPOINTMENT (OUTPATIENT)
Dept: CARDIOLOGY | Facility: CLINIC | Age: 72
End: 2022-10-17

## 2022-10-17 ENCOUNTER — NON-APPOINTMENT (OUTPATIENT)
Age: 72
End: 2022-10-17

## 2022-10-17 VITALS
WEIGHT: 176 LBS | SYSTOLIC BLOOD PRESSURE: 156 MMHG | RESPIRATION RATE: 16 BRPM | DIASTOLIC BLOOD PRESSURE: 60 MMHG | BODY MASS INDEX: 28.41 KG/M2 | HEART RATE: 81 BPM | OXYGEN SATURATION: 100 %

## 2022-10-17 PROCEDURE — 99214 OFFICE O/P EST MOD 30 MIN: CPT

## 2022-10-17 PROCEDURE — 93000 ELECTROCARDIOGRAM COMPLETE: CPT

## 2022-10-17 RX ORDER — HYDROCHLOROTHIAZIDE 12.5 MG/1
12.5 TABLET ORAL DAILY
Qty: 90 | Refills: 2 | Status: DISCONTINUED | COMMUNITY
Start: 2022-04-18 | End: 2022-10-17

## 2022-10-17 RX ORDER — BENZONATATE 200 MG/1
200 CAPSULE ORAL
Qty: 90 | Refills: 0 | Status: ACTIVE | COMMUNITY
Start: 2022-08-04

## 2022-10-17 NOTE — CARDIOLOGY SUMMARY
[de-identified] : 8/4/2021 normal sinus rhythm incomplete right bundle branch block\par October 17, 2022.  EKG normal sinus incomplete right bundle branch [de-identified] : December 13, 2021.  Sinus rhythm 53 -118.  Average heart rate 79.  Multiple runs of atrial tachycardia.  Longest episode 27 seconds.  Highest rate around 200 bpm PAC burden 10.1% PVC burden less than 1%. [de-identified] : February 24, 2021 EF 55.  Mild mitral regurgitation.  Normal left atrium.  Mild tricuspid regurgitation.  PASP 30 mmHg [de-identified] : Cardiac coronary CT angiography.  2019.  Atherosclerotic nonobstructive disease.  Coronary calcium score 106. [de-identified] : GORDO/PVR study.  Right could not be evaluated.  Left thigh noncompressible left PT/DP borderline for mild disease.\par Carotid Doppler study.  February 24, 2021.  Mild nonobstructive disease bilaterally.\par November 10, 2020.  Right lower extremity atherosclerotic plaque with high-grade distal right common femoral/superficial femoral artery stenosis.

## 2022-10-17 NOTE — DISCUSSION/SUMMARY
[FreeTextEntry1] : 72-year-old female with above medical history active medical problems as noted below\par 1.  Peripheral arterial disease.  Continued right lower extremity claudication pain.  No ischemic changes.  No rest symptoms.  No change in claudication distance which is at around 100-200 m.\par Has been recommended by hematologist not to take any antiplatelet agents including aspirin.  \par She has been able to only take atorvastatin 40 mg.  Prescription done.  Understands limitation of management without aggressive lipid-lowering.\par Understands importance of statin therapy.\par Without antiplatelet agent she is at higher risk of complication related to peripheral arterial disease and associated morbidity mortality including limb ischemia and loss.\par Follow with vascular surgery.\par 2.  Essential hypertension.  Uncontrolled.  Stop taking chlorthalidone.  Recommended low-dose of hydrochlorothiazide.  Continue amlodipine 5 mg/irbesartan 300 mg/metoprolol 50 mg.  Recommended hydration.  Asked magnesium supplement.  Follow-up labs.  Low-salt diet.  Regular activity and exercise.  Nonpharmacological therapy including walking has been discussed.  Goal less than 140/90 preferably less than 130/80\par 3.   Dyslipidemia.  Atorvastatin at least at 40 mg.\par 4..  Dyspnea.  Mild emphysema.  Unable to tolerate inhalers.  Follow-up with pulmonologist.  Echocardiogram will be repeated.  If reduction in LV systolic function or other changes we will discuss further evaluation management which may include left/right heart catheterization.\par 5.  Nonobstructive coronary artery disease.  Continue lifestyle and risk factor modifications.  Continue statin and risk factor modification.  Again reviewed indication for aspirin which she is unable to take.  Limitation of management discussed.  Higher dose of statin therapy recommended.\par 6 intermittent palpitations.  New onset.  Multiple episodes of PSVT/atrial tachycardia.  On metoprolol 50 mg.  Improved symptoms.  Recommended to follow\par \par \par Counseling regarding low saturated fat, salt and carbohydrate intake was reviewed. Active lifestyle and regular. Exercise along with weight management is advised.\par All the above were at length reviewed. Answered all the questions. Thank you very much for this kind referral. Please do not hesitate to give me a call for any question.\par Part of this transcription was done with voice recognition software and phonetically similar errors are common. I apologize for that. Please do not hesitate to call for any questions due to above.\par \par Sincerely,\par Sabine Betts MD,FACC,FASE\par \par

## 2022-10-17 NOTE — PHYSICAL EXAM
[Well Developed] : well developed [Well Nourished] : well nourished [No Acute Distress] : no acute distress [Normal S1, S2] : normal S1, S2 [No Rub] : no rub [No Gallop] : no gallop [Clear Lung Fields] : clear lung fields [Good Air Entry] : good air entry [No Respiratory Distress] : no respiratory distress  [Normal Gait] : normal gait [No Edema] : no edema [No Cyanosis] : no cyanosis [No Clubbing] : no clubbing [Normal Radial B/L] : normal radial B/L [Diminished Pedal Pulses ___] : diminished pedal pulses [unfilled] [Moves all extremities] : moves all extremities [Normal Speech] : normal speech [Alert and Oriented] : alert and oriented [de-identified] : Systolic murmur 1/6 at the base.

## 2022-10-17 NOTE — ASSESSMENT
[FreeTextEntry1] : Reviewed on April 29, 2019\par EKG normal sinus rhythm, incomplete right bundle branch block.\par Labs TSH 1.34, April 9, 2019\par Labs March 20, 2019 p.o. hemoglobin 13.2.\par Creatinine 1.0, BUN 18, potassium 4.2, sodium 142.\par \par Reviewed on July 26, 2019\par Labs from May 30, 2019 had shown normal. BNP, HDL 80 .\par Echocardiogram June 18, 2019 and ejection fraction 60% mild mitral regurgitation\par Carotid Doppler study June 18, 2019 mild carotid atherosclerosis nonobstructive.\par Exercise treadmill stress test low level of exercise. Suboptimal.\par Nuclear marker perfusion scan could not be completed because of technical difficulty\par \par Reviewed on August 23, 2019\par Coronary CTA, August 15, 2019. No active pulmonary disease.\par Coronary calcium score 106.\par Atherosclerosis changes with no hemodynamically significant stenosis. Mixture of calcified and noncalcified plaque in proximal and midportion of the LAD, left circumflex/OM one, right coronary artery.\par \par Reviewed October 30, 2020.\par GORDO/PVR study.  Left lower extremity noncompressible.  Right could not be evaluated.\par Labs which were done recently showed stable  CMP and lipid panel.\par \par Reviewed November 30, 2020\par Right lower extremity Doppler ultrasound showed a high-grade distal right common femoral/superficial femoral artery stenosis.\par Labs from November 23, 2020 sodium 135 potassium 3.7 creatinine 1.05\par \par Reviewed August 4, 2021.\par Echocardiogram/carotid Doppler study from February 2021 were reviewed.\par Right lower extremity ultrasound November 10, 2020 was reviewed.\par \par Reviewed on December 13, 2021.\par Most recent labs September 22, 2021.  LDL 88 HDL 91 triglycerides 70 creatinine 1.1 sodium 139 potassium 4.0 LFT normal\par \par Reviewed on April 18, 2022.  Labs were done at outside facility.\par Labs from January 13, 2022 creatinine 1.14 sodium 138 potassium 3.9 LDL 93 HDL 90 triglycerides 85 total cholesterol 209\par \par Reviewed on October 17, 2022.\par EKG as noted above\par Labs Amena 15, 2022 reviewed  HDL 93 triglycerides 73 total cholesterol 211

## 2022-10-17 NOTE — REVIEW OF SYSTEMS
[Feeling Fatigued] : feeling fatigued [SOB] : shortness of breath [Dyspnea on exertion] : dyspnea during exertion [Leg Claudication] : intermittent leg claudication [Palpitations] : palpitations [Joint Pain] : joint pain [Joint Stiffness] : joint stiffness [Negative] : Heme/Lymph [Fever] : no fever [Weight Gain (___ Lbs)] : no recent weight gain [Chills] : no chills [Weight Loss (___ Lbs)] : no recent weight loss [Chest Discomfort] : no chest discomfort [Lower Ext Edema] : no extremity edema [Orthopnea] : no orthopnea [PND] : no PND [Syncope] : no syncope [Cough] : no cough [Wheezing] : no wheezing [Coughing Up Blood] : no hemoptysis [Snoring] : no snoring [FreeTextEntry9] : Leg cramps

## 2022-10-17 NOTE — REASON FOR VISIT
[Symptom and Test Evaluation] : symptom and test evaluation [Hypertension] : hypertension [FreeTextEntry1] : 72-year-old female comes in for follow-up consultation.  Since last seen she stopped taking chlorthalidone.  Has increased edema.  Elevated blood pressure.  She has stopped taking chlorthalidone for leg cramps.  She has still intermittent leg cramps.  Though less.  She has not been controlling her diet for salt intake.\par Continues to have intermittent claudication pain at walking 200 yards distance which gets better after stopping for few minutes there is no resting symptoms.  There is no change in the color of the extremities.\par She did not underwent any procedures as recommended based on vascular ultrasound few months ago.  There is no worsening of her claudication distance.\par Does not do regular exercises as recommended.  She did not see vascular surgeon as advised.\par \par Her gastroesophageal reflux disease symptoms have remained the same.  She did not take pantoprazole as advised.  She was also recommended to see gastroenterologist.\par \par She saw pulmonologist.  Recommended inhaler.  According to her she had significant side effects.  And stopped taking it.\par \par She has been taking atorvastatin though recently ran out\par She has no new complaint of chest pain.  There is no PND orthopnea.\par Limited functional status\par No near syncopal or syncopal event\par No hospital admission\par No other changes in medications\par She is unable to take aspirin from oncology/hematology point of view.\par

## 2023-01-13 ENCOUNTER — APPOINTMENT (OUTPATIENT)
Dept: CARDIOLOGY | Facility: CLINIC | Age: 73
End: 2023-01-13
Payer: MEDICARE

## 2023-01-13 VITALS
BODY MASS INDEX: 28.61 KG/M2 | OXYGEN SATURATION: 99 % | TEMPERATURE: 96.6 F | SYSTOLIC BLOOD PRESSURE: 132 MMHG | DIASTOLIC BLOOD PRESSURE: 66 MMHG | WEIGHT: 178 LBS | HEIGHT: 66 IN | HEART RATE: 77 BPM

## 2023-01-13 DIAGNOSIS — I10 ESSENTIAL (PRIMARY) HYPERTENSION: ICD-10-CM

## 2023-01-13 DIAGNOSIS — I34.0 NONRHEUMATIC MITRAL (VALVE) INSUFFICIENCY: ICD-10-CM

## 2023-01-13 PROCEDURE — 99214 OFFICE O/P EST MOD 30 MIN: CPT

## 2023-01-13 PROCEDURE — 93306 TTE W/DOPPLER COMPLETE: CPT

## 2023-01-13 RX ORDER — MAGNESIUM OXIDE 241.3 MG/1000MG
400 TABLET ORAL TWICE DAILY
Refills: 0 | Status: ACTIVE | COMMUNITY
Start: 2023-01-13

## 2023-01-13 RX ORDER — UBIDECARENONE 200 MG
200 CAPSULE ORAL
Refills: 0 | Status: ACTIVE | COMMUNITY
Start: 2023-01-13

## 2023-01-13 NOTE — REASON FOR VISIT
[Symptom and Test Evaluation] : symptom and test evaluation [Hypertension] : hypertension [FreeTextEntry1] : 72-year-old female comes in for follow-up consultation.  Reviewed echocardiogram.  Reviewed recent labs.\par Mild increase edema.  Taking hydrochlorothiazide 12.5 mg.  Not walking on a regular basis.\par Continues to have intermittent claudication pain at walking 200 yards distance which gets better after stopping for few minutes there is no resting symptoms.  There is no change in the color of the extremities.\par She did not underwent any procedures as recommended based on vascular ultrasound few months ago.  There is no worsening of her claudication distance.\par   She did not see vascular surgeon as advised.\par \par Her gastroesophageal reflux disease symptoms have remained the same.  She did not take pantoprazole as advised.  She was also recommended to see gastroenterologist.\par \par She saw pulmonologist.  Recommended inhaler.  According to her she had significant side effects.  And stopped taking it.\par \par Continued exertional dyspnea.\par She has no new complaint of chest pain.  There is no PND orthopnea.\par Limited functional status\par No near syncopal or syncopal event\par No hospital admission\par No other changes in medications\par She is unable to take aspirin from oncology/hematology point of view.\par

## 2023-01-13 NOTE — CARDIOLOGY SUMMARY
[de-identified] : 8/4/2021 normal sinus rhythm incomplete right bundle branch block\par October 17, 2022.  EKG normal sinus incomplete right bundle branch [de-identified] : December 13, 2021.  Sinus rhythm 53 -118.  Average heart rate 79.  Multiple runs of atrial tachycardia.  Longest episode 27 seconds.  Highest rate around 200 bpm PAC burden 10.1% PVC burden less than 1%. [de-identified] : February 24, 2021 EF 55.  Mild mitral regurgitation.  Normal left atrium.  Mild tricuspid regurgitation.  PASP 30 mmHg\par January 13, 2022 EF 60 to 65% posterior mitral valve prolapse mild TR pulmonary pressures of 45 mmHg [de-identified] : Cardiac coronary CT angiography.  2019.  Atherosclerotic nonobstructive disease.  Coronary calcium score 106. [de-identified] : GORDO/PVR study.  Right could not be evaluated.  Left thigh noncompressible left PT/DP borderline for mild disease.\par Carotid Doppler study.  February 24, 2021.  Mild nonobstructive disease bilaterally.\par November 10, 2020.  Right lower extremity atherosclerotic plaque with high-grade distal right common femoral/superficial femoral artery stenosis.

## 2023-01-13 NOTE — REVIEW OF SYSTEMS
[Feeling Fatigued] : feeling fatigued [SOB] : shortness of breath [Dyspnea on exertion] : dyspnea during exertion [Leg Claudication] : intermittent leg claudication [Palpitations] : palpitations [Joint Pain] : joint pain [Joint Stiffness] : joint stiffness [Negative] : Heme/Lymph [Fever] : no fever [Weight Gain (___ Lbs)] : no recent weight gain [Chills] : no chills [Weight Loss (___ Lbs)] : no recent weight loss [Chest Discomfort] : no chest discomfort [Lower Ext Edema] : lower extremity edema [Orthopnea] : no orthopnea [PND] : no PND [Syncope] : no syncope [Cough] : no cough [Wheezing] : no wheezing [Coughing Up Blood] : no hemoptysis [Snoring] : no snoring [FreeTextEntry9] : Leg cramps

## 2023-01-13 NOTE — PHYSICAL EXAM
[Well Developed] : well developed [Well Nourished] : well nourished [No Acute Distress] : no acute distress [Normal S1, S2] : normal S1, S2 [No Rub] : no rub [No Gallop] : no gallop [Clear Lung Fields] : clear lung fields [Good Air Entry] : good air entry [No Respiratory Distress] : no respiratory distress  [Normal Gait] : normal gait [No Cyanosis] : no cyanosis [No Clubbing] : no clubbing [Normal Radial B/L] : normal radial B/L [Diminished Pedal Pulses ___] : diminished pedal pulses [unfilled] [Moves all extremities] : moves all extremities [Normal Speech] : normal speech [Alert and Oriented] : alert and oriented [Edema ___] : edema [unfilled] [de-identified] : Systolic murmur 1/6 at the base.

## 2023-01-13 NOTE — ASSESSMENT
[FreeTextEntry1] : Reviewed on April 29, 2019\par EKG normal sinus rhythm, incomplete right bundle branch block.\par Labs TSH 1.34, April 9, 2019\par Labs March 20, 2019 p.o. hemoglobin 13.2.\par Creatinine 1.0, BUN 18, potassium 4.2, sodium 142.\par \par Reviewed on July 26, 2019\par Labs from May 30, 2019 had shown normal. BNP, HDL 80 .\par Echocardiogram June 18, 2019 and ejection fraction 60% mild mitral regurgitation\par Carotid Doppler study June 18, 2019 mild carotid atherosclerosis nonobstructive.\par Exercise treadmill stress test low level of exercise. Suboptimal.\par Nuclear marker perfusion scan could not be completed because of technical difficulty\par \par Reviewed on August 23, 2019\par Coronary CTA, August 15, 2019. No active pulmonary disease.\par Coronary calcium score 106.\par Atherosclerosis changes with no hemodynamically significant stenosis. Mixture of calcified and noncalcified plaque in proximal and midportion of the LAD, left circumflex/OM one, right coronary artery.\par \par Reviewed October 30, 2020.\par GORDO/PVR study.  Left lower extremity noncompressible.  Right could not be evaluated.\par Labs which were done recently showed stable  CMP and lipid panel.\par \par Reviewed November 30, 2020\par Right lower extremity Doppler ultrasound showed a high-grade distal right common femoral/superficial femoral artery stenosis.\par Labs from November 23, 2020 sodium 135 potassium 3.7 creatinine 1.05\par \par Reviewed August 4, 2021.\par Echocardiogram/carotid Doppler study from February 2021 were reviewed.\par Right lower extremity ultrasound November 10, 2020 was reviewed.\par \par Reviewed on December 13, 2021.\par Most recent labs September 22, 2021.  LDL 88 HDL 91 triglycerides 70 creatinine 1.1 sodium 139 potassium 4.0 LFT normal\par \par Reviewed on April 18, 2022.  Labs were done at outside facility.\par Labs from January 13, 2022 creatinine 1.14 sodium 138 potassium 3.9 LDL 93 HDL 90 triglycerides 85 total cholesterol 209\par \par Reviewed on October 17, 2022.\par EKG as noted above\par Labs Amena 15, 2022 reviewed  HDL 93 triglycerides 73 total cholesterol 211\par \par Reviewed on January 13, 2023.\par Echocardiogram January 13, 2023 as noted above\par December 2022 N-terminal proBNP 372 which is higher than 174 noted in the past\par December 29, 2022 creatinine 1.13 sodium 141 potassium 4.2 hemoglobin 12.3 platelet count 265

## 2023-01-13 NOTE — DISCUSSION/SUMMARY
[FreeTextEntry1] : 72-year-old female with above medical history active medical problems as noted below\par 1.  Peripheral arterial disease.  Continued right lower extremity claudication pain.  No ischemic changes.  No rest symptoms.  No change in claudication distance which is at around 100-200 m.\par Has been recommended by hematologist not to take any antiplatelet agents including aspirin.  \par She has been able to only take atorvastatin 40 mg.    Understands limitation of management without aggressive lipid-lowering.\par Understands importance of statin therapy.\par Without antiplatelet agent she is at higher risk of complication related to peripheral arterial disease and associated morbidity mortality including limb ischemia and loss.\par Follow with vascular surgery.\par 2.  Essential hypertension.  Mildly uncontrolled.  Edema.  On amlodipine/ARB/hydrochlorothiazide/metoprolol\par Increase hydrochlorothiazide to 25 mg\par   Recommended hydration.  Asked magnesium supplement.  Follow-up labs.  Low-salt diet.  Regular activity and exercise.  Nonpharmacological therapy including walking has been discussed.  Goal less than 140/90 preferably less than 130/80\par 3.   Dyslipidemia.  Atorvastatin at least at 40 mg.\par 4..  Dyspnea.  Mild emphysema.  Unable to tolerate inhalers.  Mild pulmonary hypertension.  Echocardiogram shows preserved EF.  Continued heart failure of preserved EF type of symptoms.  Unclear etiology.  Serum electrophoresis and serum light chains ordered.  NM/PYP scan for amyloidosis ordered.\par Low-salt diet.  Increasing activity.  Weight reduction.  Blood pressure control.\par If no etiology noted also consider sleep apnea evaluation.\par 5.  Nonobstructive coronary artery disease.  Continue lifestyle and risk factor modifications.  Continue statin and risk factor modification.  Again reviewed indication for aspirin which she is unable to take.  Limitation of management discussed.  Also unable to take higher dose of statin and declines any addition of Zetia or injectable medications\par 6 intermittent palpitations.  New onset.  Multiple episodes of PSVT/atrial tachycardia.  On metoprolol 50 mg.  Improved symptoms.  Recommended to follow\par \par \par Counseling regarding low saturated fat, salt and carbohydrate intake was reviewed. Active lifestyle and regular. Exercise along with weight management is advised.\par All the above were at length reviewed. Answered all the questions. Thank you very much for this kind referral. Please do not hesitate to give me a call for any question.\par Part of this transcription was done with voice recognition software and phonetically similar errors are common. I apologize for that. Please do not hesitate to call for any questions due to above.\par \par Sincerely,\par Sabine Betts MD,FACC,SHAGUFTA\par \par

## 2023-01-26 ENCOUNTER — APPOINTMENT (OUTPATIENT)
Dept: CARDIOLOGY | Facility: CLINIC | Age: 73
End: 2023-01-26
Payer: MEDICARE

## 2023-01-26 PROCEDURE — 78803 RP LOCLZJ TUM SPECT 1 AREA: CPT

## 2023-01-26 PROCEDURE — A9538 TC99M PYROPHOSPHATE: CPT

## 2023-02-08 ENCOUNTER — NON-APPOINTMENT (OUTPATIENT)
Age: 73
End: 2023-02-08

## 2023-05-23 ENCOUNTER — APPOINTMENT (OUTPATIENT)
Dept: ULTRASOUND IMAGING | Facility: CLINIC | Age: 73
End: 2023-05-23

## 2023-05-23 ENCOUNTER — APPOINTMENT (OUTPATIENT)
Dept: MAMMOGRAPHY | Facility: CLINIC | Age: 73
End: 2023-05-23
Payer: MEDICARE

## 2023-05-23 ENCOUNTER — APPOINTMENT (OUTPATIENT)
Dept: RADIOLOGY | Facility: CLINIC | Age: 73
End: 2023-05-23

## 2023-05-23 PROCEDURE — 76641 ULTRASOUND BREAST COMPLETE: CPT | Mod: LT,GA

## 2023-05-23 PROCEDURE — 77067 SCR MAMMO BI INCL CAD: CPT | Mod: 52,LT

## 2023-05-23 PROCEDURE — 77063 BREAST TOMOSYNTHESIS BI: CPT | Mod: 52,LT

## 2023-05-23 PROCEDURE — 77080 DXA BONE DENSITY AXIAL: CPT

## 2023-06-09 ENCOUNTER — RX RENEWAL (OUTPATIENT)
Age: 73
End: 2023-06-09

## 2023-07-14 ENCOUNTER — APPOINTMENT (OUTPATIENT)
Dept: CARDIOLOGY | Facility: CLINIC | Age: 73
End: 2023-07-14
Payer: MEDICARE

## 2023-07-14 ENCOUNTER — NON-APPOINTMENT (OUTPATIENT)
Age: 73
End: 2023-07-14

## 2023-07-14 VITALS
DIASTOLIC BLOOD PRESSURE: 60 MMHG | BODY MASS INDEX: 28.45 KG/M2 | SYSTOLIC BLOOD PRESSURE: 138 MMHG | HEIGHT: 66 IN | WEIGHT: 177 LBS | HEART RATE: 60 BPM | OXYGEN SATURATION: 100 %

## 2023-07-14 PROCEDURE — 99214 OFFICE O/P EST MOD 30 MIN: CPT

## 2023-07-14 PROCEDURE — 93000 ELECTROCARDIOGRAM COMPLETE: CPT

## 2023-07-14 RX ORDER — HYDROCHLOROTHIAZIDE 12.5 MG/1
12.5 TABLET ORAL EVERY OTHER DAY
Qty: 45 | Refills: 3 | Status: ACTIVE | COMMUNITY

## 2023-07-14 RX ORDER — HYDROCHLOROTHIAZIDE 25 MG/1
25 TABLET ORAL
Qty: 90 | Refills: 3 | Status: DISCONTINUED | COMMUNITY
Start: 2022-10-17 | End: 2023-07-14

## 2023-07-14 RX ORDER — AMLODIPINE BESYLATE 5 MG/1
5 TABLET ORAL
Qty: 90 | Refills: 3 | Status: DISCONTINUED | COMMUNITY
Start: 2021-08-04 | End: 2023-07-14

## 2023-07-14 NOTE — REASON FOR VISIT
[Symptom and Test Evaluation] : symptom and test evaluation [Hypertension] : hypertension [Hyperlipidemia] : hyperlipidemia [Coronary Artery Disease] : coronary artery disease [Carotid, Aortic and Peripheral Vascular Disease] : carotid, aortic and peripheral vascular disease [FreeTextEntry3] : Dr. Herrera [FreeTextEntry1] : 73-year-old female is seen in the office for follow-up consultation.  Reviewed labs.  Reviewed EKG\par Because of renal insufficiency and edema amlodipine was decreased.  Hydrochlorothiazide was decreased to 25 mg every other day.  Her edema has improved.  Not walking on a regular basis.\par Continues to have intermittent claudication pain at walking 200 yards distance which gets better after stopping for few minutes there is no resting symptoms.  There is no change in the color of the extremities.\par She did not underwent any procedures as recommended based on vascular ultrasound few months ago.  There is no worsening of her claudication distance.\par   She did not see vascular surgeon as advised.\par \par Her gastroesophageal reflux disease symptoms have remained the same.  She did not take pantoprazole as advised.  She was also recommended to see gastroenterologist.  She has not seen anyone yet.\par \par She saw pulmonologist.  Recommended inhaler.  According to her she had significant side effects.  And stopped taking it.\par \par Continued exertional dyspnea.\par She has no new complaint of chest pain.  There is no PND orthopnea.\par Limited functional status\par No near syncopal or syncopal event\par No hospital admission\par No other changes in medications\par She is unable to take aspirin from oncology/hematology point of view.\par \par Continue intermittent iron infusion.  Unable to take aspirin as per hematologist because of significant intermittent blood loss anemia of unclear etiology.

## 2023-07-14 NOTE — ASSESSMENT
[FreeTextEntry1] : Reviewed on July 14, 2023\par EKG as noted above\par Labs which showed done recently 7/12/2023 hemoglobin 11.8 platelet count 288 sodium 140 potassium 4.1 creatinine 1.43.  LFT normal

## 2023-07-14 NOTE — PHYSICAL EXAM
[Well Developed] : well developed [Well Nourished] : well nourished [No Acute Distress] : no acute distress [Normal S1, S2] : normal S1, S2 [No Rub] : no rub [No Gallop] : no gallop [Clear Lung Fields] : clear lung fields [Good Air Entry] : good air entry [No Respiratory Distress] : no respiratory distress  [Normal Gait] : normal gait [No Cyanosis] : no cyanosis [No Clubbing] : no clubbing [Normal Radial B/L] : normal radial B/L [Diminished Pedal Pulses ___] : diminished pedal pulses [unfilled] [Moves all extremities] : moves all extremities [Normal Speech] : normal speech [Alert and Oriented] : alert and oriented [No Edema] : no edema [de-identified] : Systolic murmur 1/6 at the base.

## 2023-07-14 NOTE — REVIEW OF SYSTEMS
[Feeling Fatigued] : feeling fatigued [SOB] : shortness of breath [Dyspnea on exertion] : dyspnea during exertion [Lower Ext Edema] : lower extremity edema [Leg Claudication] : intermittent leg claudication [Palpitations] : palpitations [Joint Pain] : joint pain [Joint Stiffness] : joint stiffness [Negative] : Heme/Lymph [Fever] : no fever [Weight Gain (___ Lbs)] : no recent weight gain [Chills] : no chills [Weight Loss (___ Lbs)] : no recent weight loss [Chest Discomfort] : no chest discomfort [Orthopnea] : no orthopnea [PND] : no PND [Syncope] : no syncope [Cough] : no cough [Wheezing] : no wheezing [Coughing Up Blood] : no hemoptysis [Snoring] : no snoring [FreeTextEntry9] : Leg cramps

## 2023-07-14 NOTE — DISCUSSION/SUMMARY
[FreeTextEntry1] : 73-year-old female with above medical history active medical problems as noted below\par 1.  Peripheral arterial disease.  Continued right lower extremity claudication pain.  No ischemic changes.  No rest symptoms.  No change in claudication distance which is at around 100-200 m.\par Has been recommended by hematologist not to take any antiplatelet agents including aspirin.  \par She has been able to only take atorvastatin 40 mg.    Understands limitation of management without aggressive lipid-lowering.\par Understands importance of statin therapy.\par Without antiplatelet agent she is at higher risk of complication related to peripheral arterial disease and associated morbidity mortality including limb ischemia and loss.\par Follow with vascular surgery.\par 2.  Essential hypertension.  Reasonably well controlled.  Improved edema with decrease dose of amlodipine.  Complain of significant fatigue and tiredness.  And renal insufficiency.\par Will decrease dose of hydrochlorothiazide to 12.5 mg.  Continue amlodipine/ARB/hydrochlorothiazide/metoprolol\par Follow renal function\par Increasing activity.  Follow-up labs.  Low-salt diet.  Regular activity and exercise.  Nonpharmacological therapy including walking has been discussed.  Goal less than 140/90 preferably less than 130/80\par 3.   Dyslipidemia.  Atorvastatin at least at 40 mg.  Fasting lipid panel ordered.\par 4..  Dyspnea.  Mild emphysema.  Unable to tolerate inhalers.  Mild pulmonary hypertension.  Echocardiogram shows preserved EF.  Continued heart failure of preserved EF type of symptoms.  Unclear etiology.  PYP scan was negative for amyloidosis related to ATTR.\par Low-salt diet.  Increasing activity.  Weight reduction.  Blood pressure control.  N-terminal proBNP not significantly elevated when last checked.\par If no etiology noted also consider sleep apnea evaluation.  Continue regular activity.\par 5.  Nonobstructive coronary artery disease.  Continue lifestyle and risk factor modifications.  Continue statin and risk factor modification.  Again reviewed indication for aspirin which she is unable to take.  Limitation of management discussed.  Also unable to take higher dose of statin and declines any addition of Zetia or injectable medications\par 6 intermittent palpitations.  New onset.  Multiple episodes of PSVT/atrial tachycardia.  On metoprolol 50 mg.  Improved symptoms.  Recommended to follow\par Overall unable to have invasive evaluation and management for coronary as well as peripheral vascular disease without able to use aspirin.\par \par Counseling regarding low saturated fat, salt and carbohydrate intake was reviewed. Active lifestyle and regular. Exercise along with weight management is advised.\par All the above were at length reviewed. Answered all the questions. Thank you very much for this kind referral. Please do not hesitate to give me a call for any question.\par Part of this transcription was done with voice recognition software and phonetically similar errors are common. I apologize for that. Please do not hesitate to call for any questions due to above.\par \par Sincerely,\par Sabine Betts MD,FACC,FASE\par \par

## 2023-07-14 NOTE — CARDIOLOGY SUMMARY
[de-identified] : 8/4/2021 normal sinus rhythm incomplete right bundle branch block\par October 17, 2022.  EKG normal sinus incomplete right bundle branch\par July 14 2023 sinus bradycardia incomplete right bundle branch block [de-identified] : December 13, 2021.  Sinus rhythm 53 -118.  Average heart rate 79.  Multiple runs of atrial tachycardia.  Longest episode 27 seconds.  Highest rate around 200 bpm PAC burden 10.1% PVC burden less than 1%. [de-identified] : February 24, 2021 EF 55.  Mild mitral regurgitation.  Normal left atrium.  Mild tricuspid regurgitation.  PASP 30 mmHg\par January 13, 2022 EF 60 to 65% posterior mitral valve prolapse mild TR pulmonary pressures of 45 mmHg [de-identified] : Cardiac coronary CT angiography.  2019.  Atherosclerotic nonobstructive disease.  Coronary calcium score 106. [de-identified] : GORDO/PVR study.  Right could not be evaluated.  Left thigh noncompressible left PT/DP borderline for mild disease.\par Carotid Doppler study.  February 24, 2021.  Mild nonobstructive disease bilaterally.\par November 10, 2020.  Right lower extremity atherosclerotic plaque with high-grade distal right common femoral/superficial femoral artery stenosis.

## 2023-10-27 ENCOUNTER — APPOINTMENT (OUTPATIENT)
Dept: CARDIOLOGY | Facility: CLINIC | Age: 73
End: 2023-10-27
Payer: MEDICARE

## 2023-10-27 VITALS
BODY MASS INDEX: 29.09 KG/M2 | WEIGHT: 181 LBS | SYSTOLIC BLOOD PRESSURE: 146 MMHG | HEIGHT: 66 IN | DIASTOLIC BLOOD PRESSURE: 60 MMHG | HEART RATE: 67 BPM | OXYGEN SATURATION: 99 %

## 2023-10-27 DIAGNOSIS — I65.23 OCCLUSION AND STENOSIS OF BILATERAL CAROTID ARTERIES: ICD-10-CM

## 2023-10-27 DIAGNOSIS — I25.10 ATHEROSCLEROTIC HEART DISEASE OF NATIVE CORONARY ARTERY W/OUT ANGINA PECTORIS: ICD-10-CM

## 2023-10-27 PROCEDURE — 99214 OFFICE O/P EST MOD 30 MIN: CPT

## 2023-10-27 RX ORDER — ATORVASTATIN CALCIUM 40 MG/1
40 TABLET, FILM COATED ORAL
Qty: 90 | Refills: 3 | Status: ACTIVE | COMMUNITY
Start: 2019-08-23 | End: 1900-01-01

## 2023-10-27 RX ORDER — METOPROLOL SUCCINATE 50 MG/1
50 TABLET, EXTENDED RELEASE ORAL
Qty: 90 | Refills: 3 | Status: ACTIVE | COMMUNITY
Start: 2022-01-03 | End: 1900-01-01

## 2023-10-27 RX ORDER — IRBESARTAN 300 MG/1
300 TABLET ORAL
Qty: 90 | Refills: 3 | Status: ACTIVE | COMMUNITY
Start: 2019-04-08 | End: 1900-01-01

## 2023-10-27 RX ORDER — AMLODIPINE BESYLATE 5 MG/1
5 TABLET ORAL
Qty: 90 | Refills: 3 | Status: ACTIVE | COMMUNITY
Start: 1900-01-01 | End: 1900-01-01

## 2024-01-01 NOTE — REASON FOR VISIT
[Parent] : parent [Symptom and Test Evaluation] : symptom and test evaluation [Hypertension] : hypertension [FreeTextEntry1] : 71-year-old female comes in for follow-up consultation.  Comes in with complaint of intermittent palpitations over last 1 to 2 months.  No associated dizziness near syncopal or syncopal event.\par Continues to have intermittent claudication pain at walking 200 yards distance which gets better after stopping for few minutes there is no resting symptoms.  There is no change in the color of the extremities.\par She did not underwent any procedures as recommended based on vascular ultrasound few months ago.  There is no worsening of her claudication distance.\par Does not do regular exercises as recommended.  She did not see vascular surgeon as advised.\par \par She saw pulmonologist.  Recommended inhaler.  According to her she had significant side effects.  And stopped taking it.\par \par She could not tolerate every day atorvastatin and chlorthalidone.  Taking it every other day.\par She is continue intermittent cramps in the right leg.\par She has no new complaint of chest pain.  There is no PND orthopnea.\par Limited functional status\par Intermittent palpitation which has not changed\par No near syncopal or syncopal event\par No hospital admission\par No other changes in medications\par She is unable to take aspirin from oncology/hematology point of view.\par

## 2024-02-12 ENCOUNTER — NON-APPOINTMENT (OUTPATIENT)
Age: 74
End: 2024-02-12

## 2024-02-12 ENCOUNTER — APPOINTMENT (OUTPATIENT)
Dept: CARDIOLOGY | Facility: CLINIC | Age: 74
End: 2024-02-12
Payer: MEDICARE

## 2024-02-12 VITALS
SYSTOLIC BLOOD PRESSURE: 122 MMHG | WEIGHT: 185 LBS | DIASTOLIC BLOOD PRESSURE: 60 MMHG | HEART RATE: 75 BPM | BODY MASS INDEX: 29.86 KG/M2 | OXYGEN SATURATION: 98 %

## 2024-02-12 DIAGNOSIS — I73.9 PERIPHERAL VASCULAR DISEASE, UNSPECIFIED: ICD-10-CM

## 2024-02-12 DIAGNOSIS — I50.32 HYPERTENSIVE HEART DISEASE WITH HEART FAILURE: ICD-10-CM

## 2024-02-12 DIAGNOSIS — R00.2 PALPITATIONS: ICD-10-CM

## 2024-02-12 DIAGNOSIS — I11.0 HYPERTENSIVE HEART DISEASE WITH HEART FAILURE: ICD-10-CM

## 2024-02-12 DIAGNOSIS — R06.09 OTHER FORMS OF DYSPNEA: ICD-10-CM

## 2024-02-12 DIAGNOSIS — E78.5 HYPERLIPIDEMIA, UNSPECIFIED: ICD-10-CM

## 2024-02-12 DIAGNOSIS — I27.20 PULMONARY HYPERTENSION, UNSPECIFIED: ICD-10-CM

## 2024-02-12 DIAGNOSIS — I47.10 SUPRAVENTRICULAR TACHYCARDIA, UNSPECIFIED: ICD-10-CM

## 2024-02-12 PROCEDURE — G2211 COMPLEX E/M VISIT ADD ON: CPT

## 2024-02-12 PROCEDURE — 99214 OFFICE O/P EST MOD 30 MIN: CPT

## 2024-02-12 PROCEDURE — 93000 ELECTROCARDIOGRAM COMPLETE: CPT

## 2024-02-12 RX ORDER — OMEPRAZOLE 40 MG/1
40 CAPSULE, DELAYED RELEASE ORAL
Qty: 90 | Refills: 0 | Status: ACTIVE | COMMUNITY
Start: 2023-12-20

## 2024-02-12 RX ORDER — FAMOTIDINE 40 MG/1
40 TABLET, FILM COATED ORAL
Qty: 90 | Refills: 0 | Status: ACTIVE | COMMUNITY
Start: 2024-01-02

## 2024-02-12 NOTE — REVIEW OF SYSTEMS
[Feeling Fatigued] : feeling fatigued [Dyspnea on exertion] : dyspnea during exertion [SOB] : shortness of breath [Leg Claudication] : intermittent leg claudication [Lower Ext Edema] : lower extremity edema [Palpitations] : palpitations [Joint Pain] : joint pain [Negative] : Heme/Lymph [Joint Stiffness] : joint stiffness [Fever] : no fever [Weight Gain (___ Lbs)] : no recent weight gain [Chills] : no chills [Weight Loss (___ Lbs)] : no recent weight loss [Chest Discomfort] : no chest discomfort [Orthopnea] : no orthopnea [PND] : no PND [Syncope] : no syncope [Cough] : no cough [Wheezing] : no wheezing [Coughing Up Blood] : no hemoptysis [Snoring] : no snoring [FreeTextEntry9] : Leg cramps

## 2024-02-12 NOTE — DISCUSSION/SUMMARY
[FreeTextEntry1] : 73-year-old female with above medical history active medical problems as noted below 1.  Peripheral arterial disease.  Continued right lower extremity claudication pain.  No ischemic changes.  No rest symptoms.  No change in claudication distance which is at around 100-200 m. Has been recommended by hematologist not to take any antiplatelet agents including aspirin.   She has been able to only take atorvastatin 40 mg.    Does not want to increase dose of atorvastatin which she needs to lower LDL cholesterol to less than 70. Understands limitation of management without aggressive lipid-lowering. Understands importance of statin therapy. Without antiplatelet agent she is at higher risk of complication related to peripheral arterial disease and associated morbidity mortality including limb ischemia and loss. Follow with vascular surgery. 2.  Essential hypertension.  Mildly uncontrolled.  Remaining uncontrolled with Dr. Chan's office also.  She is taking amlodipine every other day.  Recommended to take it daily.  Continue hydrochlorothiazide every other day along with irbesartan. Follow blood pressure closely. Complain of significant fatigue and tiredness.  Now improving renal insufficiency. Continue hydrochlorothiazide 12.5 mg every other day. Continue amlodipine/ARB/hydrochlorothiazide/metoprolol Repeat labs. Increasing activity.  Follow-up labs.  Low-salt diet.  Regular activity and exercise.  Nonpharmacological therapy including walking has been discussed.  Goal less than 140/90 preferably less than 130/80 3.   Dyslipidemia.  Atorvastatin at least at 40 mg.  Fasting lipid panel ordered. 4..  Dyspnea.  Mild emphysema.  Unable to tolerate inhalers.  Mild pulmonary hypertension.  Follow-up with pulmonologist recommended.  Repeat echocardiogram. In the past coronary CTA showing normal coronary arteries and PYP scan was negative for amyloidosis related to ATTR. Low-salt diet.  Increasing activity.  Weight reduction.  Blood pressure control.  N-terminal proBNP not significantly elevated when last checked. If no etiology noted also consider sleep apnea evaluation.  Continue regular activity. 5.  Nonobstructive coronary artery disease.  Continue lifestyle and risk factor modifications.  Continue statin and risk factor modification.  Again reviewed indication for aspirin which she is unable to take.  Limitation of management discussed.  Also unable to take higher dose of statin and declines any addition of Zetia or injectable medications 6 intermittent palpitations.  .  Multiple episodes of PSVT/atrial tachycardia.  On metoprolol 50 mg.  Increasing symptoms.  We will repeat evaluate with 3-day event monitor..  Recommended to follow Overall unable to have invasive evaluation and management for coronary as well as peripheral vascular disease without able to use aspirin. 7.  Possible significant gastroesophageal reflux disease symptoms.  Increase H2 blocker and PPI short-term.  Follow-up with gastroenterologist recommended.  Counseling regarding low saturated fat, salt and carbohydrate intake was reviewed. Active lifestyle and regular. Exercise along with weight management is advised. All the above were at length reviewed. Answered all the questions. Thank you very much for this kind referral. Please do not hesitate to give me a call for any question. Part of this transcription was done with voice recognition software and phonetically similar errors are common. I apologize for that. Please do not hesitate to call for any questions due to above.  Sincerely, Sabine Betts MD,FACC,SHAGUFTA

## 2024-02-12 NOTE — PHYSICAL EXAM
[Well Developed] : well developed [No Acute Distress] : no acute distress [Well Nourished] : well nourished [Normal S1, S2] : normal S1, S2 [No Rub] : no rub [No Gallop] : no gallop [Clear Lung Fields] : clear lung fields [Good Air Entry] : good air entry [Normal Gait] : normal gait [No Respiratory Distress] : no respiratory distress  [No Edema] : no edema [No Cyanosis] : no cyanosis [Normal Radial B/L] : normal radial B/L [No Clubbing] : no clubbing [Normal Speech] : normal speech [Diminished Pedal Pulses ___] : diminished pedal pulses [unfilled] [Moves all extremities] : moves all extremities [Alert and Oriented] : alert and oriented [de-identified] : Systolic murmur 1/6 at the base.

## 2024-02-12 NOTE — REASON FOR VISIT
[Symptom and Test Evaluation] : symptom and test evaluation [Hyperlipidemia] : hyperlipidemia [Coronary Artery Disease] : coronary artery disease [Hypertension] : hypertension [Carotid, Aortic and Peripheral Vascular Disease] : carotid, aortic and peripheral vascular disease [FreeTextEntry3] : Dr. Rain [FreeTextEntry1] : 73-year-old comes in for follow-up consultation.  Exertional dyspnea which has gotten worse during cold months.  Has not been taking her inhalers and has not seen pulmonologist recently.  Also 1 episode while walking in the cold she felt possible significant reflux.  Tightness.  Shortness of breath.  Which resolved quickly though she has continued intermittent substernal burning sensation after any kind of food.  She has been taking her PPI and H2 blockers on a regular basis. She is taking intermittent amlodipine and hydrochlorothiazide.  She is mild ankle edema intermittently.  She does complain of leg cramps/claudication pain.  She does not walk on a regular basis as advised. There is no PND orthopnea. intermittent palpitation  no dizziness near syncopal or syncopal event. Has not seen vascular surgery as advised.  Her gastroesophageal reflux disease symptoms have remained the same.  She did not take pantoprazole as advised.  She was also recommended to see gastroenterologist.  She has not seen anyone yet.  She saw pulmonologist.  Recommended inhaler.  According to her she had significant side effects.  And stopped taking it.  Continued exertional dyspnea. She has no new complaint of chest pain.  There is no PND orthopnea. Limited functional status No near syncopal or syncopal event No hospital admission No other changes in medications She is unable to take aspirin from oncology/hematology point of view.  Continue intermittent iron infusion.  Unable to take aspirin as per hematologist because of significant intermittent blood loss anemia of unclear etiology.

## 2024-02-12 NOTE — CARDIOLOGY SUMMARY
[de-identified] : 8/4/2021 normal sinus rhythm incomplete right bundle branch block October 17, 2022.  EKG normal sinus incomplete right bundle branch July 14 2023 sinus bradycardia incomplete right bundle branch block February 12, 2024.  Normal sinus rhythm nonischemic. [de-identified] : December 13, 2021.  Sinus rhythm 53 -118.  Average heart rate 79.  Multiple runs of atrial tachycardia.  Longest episode 27 seconds.  Highest rate around 200 bpm PAC burden 10.1% PVC burden less than 1%. [de-identified] : February 24, 2021 EF 55.  Mild mitral regurgitation.  Normal left atrium.  Mild tricuspid regurgitation.  PASP 30 mmHg\par  January 13, 2022 EF 60 to 65% posterior mitral valve prolapse mild TR pulmonary pressures of 45 mmHg [de-identified] : Cardiac coronary CT angiography.  2019.  Atherosclerotic nonobstructive disease.  Coronary calcium score 106. [de-identified] : GORDO/PVR study.  Right could not be evaluated.  Left thigh noncompressible left PT/DP borderline for mild disease.\par  Carotid Doppler study.  February 24, 2021.  Mild nonobstructive disease bilaterally.\par  November 10, 2020.  Right lower extremity atherosclerotic plaque with high-grade distal right common femoral/superficial femoral artery stenosis.

## 2024-02-12 NOTE — ASSESSMENT
[FreeTextEntry1] : Reviewed on October 27, 2023.  Recent labs from September were reviewed.  They are scanned in the chart. Reviewed on February 12, 2024.  EKG reviewed.  Labs which were done recently reviewed which has shown reviewed.  Which has shown creatinine 1.39 sodium potassium normal LFTs stable HDL 82

## 2024-08-19 ENCOUNTER — APPOINTMENT (OUTPATIENT)
Dept: CARDIOLOGY | Facility: CLINIC | Age: 74
End: 2024-08-19
Payer: MEDICARE

## 2024-08-19 VITALS
OXYGEN SATURATION: 98 % | BODY MASS INDEX: 29.89 KG/M2 | HEART RATE: 74 BPM | DIASTOLIC BLOOD PRESSURE: 68 MMHG | WEIGHT: 186 LBS | SYSTOLIC BLOOD PRESSURE: 126 MMHG | HEIGHT: 66 IN

## 2024-08-19 DIAGNOSIS — I11.0 HYPERTENSIVE HEART DISEASE WITH HEART FAILURE: ICD-10-CM

## 2024-08-19 DIAGNOSIS — E78.5 HYPERLIPIDEMIA, UNSPECIFIED: ICD-10-CM

## 2024-08-19 DIAGNOSIS — I25.119 ATHEROSCLEROTIC HEART DISEASE OF NATIVE CORONARY ARTERY WITH UNSPECIFIED ANGINA PECTORIS: ICD-10-CM

## 2024-08-19 DIAGNOSIS — I10 ESSENTIAL (PRIMARY) HYPERTENSION: ICD-10-CM

## 2024-08-19 DIAGNOSIS — R06.09 OTHER FORMS OF DYSPNEA: ICD-10-CM

## 2024-08-19 DIAGNOSIS — R00.2 PALPITATIONS: ICD-10-CM

## 2024-08-19 DIAGNOSIS — I50.32 HYPERTENSIVE HEART DISEASE WITH HEART FAILURE: ICD-10-CM

## 2024-08-19 DIAGNOSIS — I47.10 SUPRAVENTRICULAR TACHYCARDIA, UNSPECIFIED: ICD-10-CM

## 2024-08-19 PROCEDURE — 93306 TTE W/DOPPLER COMPLETE: CPT

## 2024-08-19 PROCEDURE — 99214 OFFICE O/P EST MOD 30 MIN: CPT

## 2024-08-19 PROCEDURE — G2211 COMPLEX E/M VISIT ADD ON: CPT

## 2024-08-19 RX ORDER — CLOTRIMAZOLE AND BETAMETHASONE DIPROPIONATE 10; .5 MG/G; MG/G
1-0.05 CREAM TOPICAL
Qty: 15 | Refills: 0 | Status: ACTIVE | COMMUNITY
Start: 2024-08-07

## 2024-08-19 RX ORDER — DILTIAZEM HYDROCHLORIDE 120 MG/1
120 CAPSULE, EXTENDED RELEASE ORAL DAILY
Qty: 90 | Refills: 3 | Status: ACTIVE | COMMUNITY
Start: 2024-08-19 | End: 1900-01-01

## 2024-08-19 NOTE — CARDIOLOGY SUMMARY
[de-identified] : 8/4/2021 normal sinus rhythm incomplete right bundle branch block October 17, 2022.  EKG normal sinus incomplete right bundle branch July 14 2023 sinus bradycardia incomplete right bundle branch block February 12, 2024.  Normal sinus rhythm nonischemic. [de-identified] : December 13, 2021.  Sinus rhythm 53 -118.  Average heart rate 79.  Multiple runs of atrial tachycardia.  Longest episode 27 seconds.  Highest rate around 200 bpm PAC burden 10.1% PVC burden less than 1%. February 2024.  3 days.  Average 70 minimum 49 [de-identified] : February 24, 2021 EF 55.  Mild mitral regurgitation.  Normal left atrium.  Mild tricuspid regurgitation.  PASP 30 mmHg January 13, 2022 EF 60 to 65% posterior mitral valve prolapse mild TR pulmonary pressures of 45 mmHg History of mitral valve leaflet prolapse mild MR mild TR PASP 32 mmHg. August 19, 2024 EF 60 to 65% mild MR.  Mild TR.  PASP 32 mmHg. [de-identified] : Cardiac coronary CT angiography.  2019.  Atherosclerotic nonobstructive disease.  Coronary calcium score 106. [de-identified] : Nuclear amyloid SPECT scan was not suggestive of ATTR cardiomyopathy.  January 2023 [de-identified] : GORDO/PVR study.  Right could not be evaluated.  Left thigh noncompressible left PT/DP borderline for mild disease.\par  Carotid Doppler study.  February 24, 2021.  Mild nonobstructive disease bilaterally.\par  November 10, 2020.  Right lower extremity atherosclerotic plaque with high-grade distal right common femoral/superficial femoral artery stenosis.

## 2024-08-19 NOTE — ASSESSMENT
[FreeTextEntry1] : Reviewed on October 27, 2023.  Recent labs from September were reviewed.  They are scanned in the chart. Reviewed on February 12, 2024.  EKG reviewed.  Labs which were done recently reviewed which has shown reviewed.  Which has shown creatinine 1.39 sodium potassium normal LFTs stable HDL 82   Reviewed on August 19, 2024. Echocardiogram, last nuclear  perfusion scan, labs were all reviewed. Event monitor also reviewed

## 2024-08-19 NOTE — PHYSICAL EXAM
[Well Developed] : well developed [Normal Venous Pressure] : normal venous pressure [Normal S1, S2] : normal S1, S2 [No Rub] : no rub [Murmur] : murmur [Clear Lung Fields] : clear lung fields [Normal Gait] : normal gait [No Edema] : no edema [No Cyanosis] : no cyanosis [Normal Radial B/L] : normal radial B/L [Normal DP B/L] : normal DP B/L [Normal Speech] : normal speech [Alert and Oriented] : alert and oriented

## 2024-08-19 NOTE — HISTORY OF PRESENT ILLNESS
[FreeTextEntry1] : Her medical history includes #1 essential hypertension. #2 stage IA papillary thyroid carcinoma, complete thyroidectomy #3  Arterial sclerosis with abnormal GORDO. #4 history of right breast cancer, status post right mastectomy, ER positive breast cancer stage IC in 1996. #5 severe gastroesophageal reflux disease. #6 osteoporosis. #7 history of relapsing mononucleosis

## 2024-08-19 NOTE — REASON FOR VISIT
[Symptom and Test Evaluation] : symptom and test evaluation [Hyperlipidemia] : hyperlipidemia [Hypertension] : hypertension [Coronary Artery Disease] : coronary artery disease [Carotid, Aortic and Peripheral Vascular Disease] : carotid, aortic and peripheral vascular disease [FreeTextEntry3] : Dr. Rain [FreeTextEntry1] : 74-year-old is here for clinical follow-up and review EKG/echocardiogram.  She has continued complain of palpitations when she walks associated with shortness of breath.  She denies any PND orthopnea.  She does complain of leg cramps/claudication pain.   There is no PND orthopnea. intermittent palpitation  no dizziness near syncopal or syncopal event.  Her gastroesophageal reflux disease symptoms have remained the same.  She was also recommended to see gastroenterologist.  She has not seen anyone yet.  She saw pulmonologist.  Recommended inhaler.  According to her she had significant side effects.  And stopped taking it.  She has no new complaint of chest pain.  There is no PND orthopnea. Limited functional status No near syncopal or syncopal event No hospital admission No other changes in medications She is unable to take aspirin from oncology/hematology point of view.  Continue intermittent iron infusion.  Unable to take aspirin as per hematologist because of significant intermittent blood loss anemia of unclear etiology.

## 2024-08-19 NOTE — DISCUSSION/SUMMARY
[FreeTextEntry1] : 73-year-old female with above medical history active medical problems as noted below 1.  Peripheral arterial disease.  Continued right lower extremity claudication pain.  No ischemic changes.  No rest symptoms.  No change in claudication distance which is at around 100-200 m. Has been recommended by hematologist not to take any antiplatelet agents including aspirin.   She has been able to only take atorvastatin 40 mg.    Does not want to increase dose of atorvastatin which she needs to lower LDL cholesterol to less than 70. Understands limitation of management without aggressive lipid-lowering. Understands importance of statin therapy. Without antiplatelet agent she is at higher risk of complication related to peripheral arterial disease and associated morbidity mortality including limb ischemia and loss. Follow with vascular surgery. 2.  Essential hypertension.  Exertional dyspnea and exertional palpitation.  Unclear etiology.  Will change metoprolol to diltiazem long-acting 120 mg daily.  Continue with irbesartan at present. Recommended labs again. We will do coronary CTA in presence of known history of nonobstructive CAD with last evaluation 2019 continued exertional dyspnea.  Risk benefits alternatives of contrast and radiation were reviewed. Follow blood pressure closely. Increasing activity.  Follow-up labs.  Low-salt diet.  Regular activity and exercise.  Nonpharmacological therapy including walking has been discussed.  Goal less than 140/90 preferably less than 130/80 3.   Dyslipidemia.  Atorvastatin at least at 40 mg.  Fasting lipid panel ordered. 4..  Dyspnea.  Mild emphysema.  Unable to tolerate inhalers.  Mild pulmonary hypertension.  Follow-up with pulmonologist recommended.  Echocardiogram stable.  Labs along with coronary CTA ordered. In the past coronary CTA showing nnobstructive and PYP scan was negative for amyloidosis related to ATTR. Low-salt diet.  Increasing activity.  Weight reduction.  Blood pressure control.  N-terminal proBNP not significantly elevated when last checked. 5.ntermittent palpitations.  .  Multiple episodes of PSVT/atrial tachycardia.  On metoprolol 50 mg.  Increasing symptoms.  We will repeat evaluate with 3-day event monitor..  Recommended to follow Overall unable to have invasive evaluation and management for coronary as well as peripheral vascular disease without able to use aspirin. 6.  Possible significant gastroesophageal reflux disease symptoms.  Increase H2 blocker and PPI short-term.  Follow-up with gastroenterologist recommended.  Counseling regarding low saturated fat, salt and carbohydrate intake was reviewed. Active lifestyle and regular. Exercise along with weight management is advised. All the above were at length reviewed. Answered all the questions. Thank you very much for this kind referral. Please do not hesitate to give me a call for any question. Part of this transcription was done with voice recognition software and phonetically similar errors are common. I apologize for that. Please do not hesitate to call for any questions due to above.  Sincerely, Sabine Betts MD,[facility] HomeCHERELLE

## 2024-09-24 ENCOUNTER — RESULT REVIEW (OUTPATIENT)
Age: 74
End: 2024-09-24